# Patient Record
Sex: FEMALE | Race: BLACK OR AFRICAN AMERICAN | NOT HISPANIC OR LATINO | Employment: OTHER | URBAN - NONMETROPOLITAN AREA
[De-identification: names, ages, dates, MRNs, and addresses within clinical notes are randomized per-mention and may not be internally consistent; named-entity substitution may affect disease eponyms.]

---

## 2021-08-20 ENCOUNTER — OFFICE VISIT (OUTPATIENT)
Dept: FAMILY MEDICINE | Facility: CLINIC | Age: 76
End: 2021-08-20
Payer: MEDICARE

## 2021-08-20 VITALS
TEMPERATURE: 99 F | WEIGHT: 142 LBS | RESPIRATION RATE: 18 BRPM | DIASTOLIC BLOOD PRESSURE: 80 MMHG | HEIGHT: 62 IN | SYSTOLIC BLOOD PRESSURE: 119 MMHG | BODY MASS INDEX: 26.13 KG/M2 | HEART RATE: 83 BPM | OXYGEN SATURATION: 97 %

## 2021-08-20 DIAGNOSIS — B35.1 TOENAIL FUNGUS: ICD-10-CM

## 2021-08-20 DIAGNOSIS — E11.9 TYPE 2 DIABETES MELLITUS WITHOUT COMPLICATION, WITH LONG-TERM CURRENT USE OF INSULIN: ICD-10-CM

## 2021-08-20 DIAGNOSIS — Z79.4 TYPE 2 DIABETES MELLITUS WITHOUT COMPLICATION, WITH LONG-TERM CURRENT USE OF INSULIN: ICD-10-CM

## 2021-08-20 DIAGNOSIS — I10 HYPERTENSION, UNSPECIFIED TYPE: Primary | ICD-10-CM

## 2021-08-20 LAB
ALBUMIN SERPL BCP-MCNC: 3.7 G/DL (ref 3.5–5)
ALBUMIN/GLOB SERPL: 0.9 {RATIO}
ALP SERPL-CCNC: 129 U/L (ref 55–142)
ALT SERPL W P-5'-P-CCNC: 34 U/L (ref 13–56)
ANION GAP SERPL CALCULATED.3IONS-SCNC: 10 MMOL/L (ref 7–16)
AST SERPL W P-5'-P-CCNC: 23 U/L (ref 15–37)
BASOPHILS # BLD AUTO: 0.03 K/UL (ref 0–0.2)
BASOPHILS NFR BLD AUTO: 0.6 % (ref 0–1)
BILIRUB SERPL-MCNC: 0.2 MG/DL (ref 0–1.2)
BUN SERPL-MCNC: 14 MG/DL (ref 7–18)
BUN/CREAT SERPL: 20 (ref 6–20)
CALCIUM SERPL-MCNC: 10.2 MG/DL (ref 8.5–10.1)
CHLORIDE SERPL-SCNC: 109 MMOL/L (ref 98–107)
CHOLEST SERPL-MCNC: 119 MG/DL (ref 0–200)
CHOLEST/HDLC SERPL: 2.6 {RATIO}
CO2 SERPL-SCNC: 27 MMOL/L (ref 21–32)
CREAT SERPL-MCNC: 0.71 MG/DL (ref 0.55–1.02)
CREAT UR-MCNC: 119 MG/DL (ref 28–219)
DIFFERENTIAL METHOD BLD: ABNORMAL
EOSINOPHIL # BLD AUTO: 0.2 K/UL (ref 0–0.5)
EOSINOPHIL NFR BLD AUTO: 3.9 % (ref 1–4)
ERYTHROCYTE [DISTWIDTH] IN BLOOD BY AUTOMATED COUNT: 14.6 % (ref 11.5–14.5)
EST. AVERAGE GLUCOSE BLD GHB EST-MCNC: 120 MG/DL
GLOBULIN SER-MCNC: 4.2 G/DL (ref 2–4)
GLUCOSE SERPL-MCNC: 77 MG/DL (ref 74–106)
HBA1C MFR BLD HPLC: 6.2 % (ref 4.5–6.6)
HCT VFR BLD AUTO: 36.9 % (ref 38–47)
HDLC SERPL-MCNC: 45 MG/DL (ref 40–60)
HGB BLD-MCNC: 12.1 G/DL (ref 12–16)
IMM GRANULOCYTES # BLD AUTO: 0 K/UL (ref 0–0.04)
IMM GRANULOCYTES NFR BLD: 0 % (ref 0–0.4)
LDLC SERPL CALC-MCNC: 50 MG/DL
LDLC/HDLC SERPL: 1.1 {RATIO}
LYMPHOCYTES # BLD AUTO: 2.69 K/UL (ref 1–4.8)
LYMPHOCYTES NFR BLD AUTO: 52.1 % (ref 27–41)
MCH RBC QN AUTO: 24.7 PG (ref 27–31)
MCHC RBC AUTO-ENTMCNC: 32.8 G/DL (ref 32–36)
MCV RBC AUTO: 75.5 FL (ref 80–96)
MICROALBUMIN UR-MCNC: 0.7 MG/DL (ref 0–2.8)
MICROALBUMIN/CREAT RATIO PNL UR: 5.9 MG/G (ref 0–30)
MONOCYTES # BLD AUTO: 0.46 K/UL (ref 0–0.8)
MONOCYTES NFR BLD AUTO: 8.9 % (ref 2–6)
MPC BLD CALC-MCNC: 11.6 FL (ref 9.4–12.4)
NEUTROPHILS # BLD AUTO: 1.78 K/UL (ref 1.8–7.7)
NEUTROPHILS NFR BLD AUTO: 34.5 % (ref 53–65)
NONHDLC SERPL-MCNC: 74 MG/DL
NRBC # BLD AUTO: 0 X10E3/UL
NRBC, AUTO (.00): 0 %
PLATELET # BLD AUTO: 252 K/UL (ref 150–400)
POTASSIUM SERPL-SCNC: 4 MMOL/L (ref 3.5–5.1)
PROT SERPL-MCNC: 7.9 G/DL (ref 6.4–8.2)
RBC # BLD AUTO: 4.89 M/UL (ref 4.2–5.4)
SODIUM SERPL-SCNC: 142 MMOL/L (ref 136–145)
TRIGL SERPL-MCNC: 119 MG/DL (ref 35–150)
VLDLC SERPL-MCNC: 24 MG/DL
WBC # BLD AUTO: 5.16 K/UL (ref 4.5–11)

## 2021-08-20 PROCEDURE — 83036 HEMOGLOBIN A1C: ICD-10-PCS | Mod: ,,, | Performed by: CLINICAL MEDICAL LABORATORY

## 2021-08-20 PROCEDURE — 80061 LIPID PANEL: CPT | Mod: ,,, | Performed by: CLINICAL MEDICAL LABORATORY

## 2021-08-20 PROCEDURE — 99214 OFFICE O/P EST MOD 30 MIN: CPT | Mod: ,,, | Performed by: FAMILY MEDICINE

## 2021-08-20 PROCEDURE — 99214 PR OFFICE/OUTPT VISIT, EST, LEVL IV, 30-39 MIN: ICD-10-PCS | Mod: ,,, | Performed by: FAMILY MEDICINE

## 2021-08-20 PROCEDURE — 82043 UR ALBUMIN QUANTITATIVE: CPT | Mod: ,,, | Performed by: CLINICAL MEDICAL LABORATORY

## 2021-08-20 PROCEDURE — 82570 MICROALBUMIN / CREATININE RATIO URINE: ICD-10-PCS | Mod: ,,, | Performed by: CLINICAL MEDICAL LABORATORY

## 2021-08-20 PROCEDURE — 82570 ASSAY OF URINE CREATININE: CPT | Mod: ,,, | Performed by: CLINICAL MEDICAL LABORATORY

## 2021-08-20 PROCEDURE — 80061 LIPID PANEL: ICD-10-PCS | Mod: ,,, | Performed by: CLINICAL MEDICAL LABORATORY

## 2021-08-20 PROCEDURE — 80053 COMPREHEN METABOLIC PANEL: CPT | Mod: ,,, | Performed by: CLINICAL MEDICAL LABORATORY

## 2021-08-20 PROCEDURE — 82043 MICROALBUMIN / CREATININE RATIO URINE: ICD-10-PCS | Mod: ,,, | Performed by: CLINICAL MEDICAL LABORATORY

## 2021-08-20 PROCEDURE — 80053 COMPREHENSIVE METABOLIC PANEL: ICD-10-PCS | Mod: ,,, | Performed by: CLINICAL MEDICAL LABORATORY

## 2021-08-20 PROCEDURE — 83036 HEMOGLOBIN GLYCOSYLATED A1C: CPT | Mod: ,,, | Performed by: CLINICAL MEDICAL LABORATORY

## 2021-08-20 PROCEDURE — 85025 COMPLETE CBC W/AUTO DIFF WBC: CPT | Mod: ,,, | Performed by: CLINICAL MEDICAL LABORATORY

## 2021-08-20 PROCEDURE — 85025 CBC WITH DIFFERENTIAL: ICD-10-PCS | Mod: ,,, | Performed by: CLINICAL MEDICAL LABORATORY

## 2021-08-20 RX ORDER — TERBINAFINE HYDROCHLORIDE 250 MG/1
250 TABLET ORAL DAILY
Qty: 30 TABLET | Refills: 2 | Status: SHIPPED | OUTPATIENT
Start: 2021-08-20 | End: 2021-09-19

## 2021-08-20 RX ORDER — AMLODIPINE BESYLATE 5 MG/1
1 TABLET ORAL DAILY
COMMUNITY
Start: 2021-08-16 | End: 2021-08-20 | Stop reason: SDUPTHER

## 2021-08-20 RX ORDER — METFORMIN HYDROCHLORIDE 500 MG/1
500 TABLET ORAL 2 TIMES DAILY
Qty: 180 TABLET | Refills: 1 | Status: SHIPPED | OUTPATIENT
Start: 2021-08-20 | End: 2022-05-11 | Stop reason: SDUPTHER

## 2021-08-20 RX ORDER — AMLODIPINE BESYLATE 5 MG/1
5 TABLET ORAL DAILY
Qty: 90 TABLET | Refills: 1 | Status: SHIPPED | OUTPATIENT
Start: 2021-08-20 | End: 2022-07-08 | Stop reason: SDUPTHER

## 2021-08-20 RX ORDER — METFORMIN HYDROCHLORIDE 500 MG/1
500 TABLET ORAL 2 TIMES DAILY
COMMUNITY
Start: 2021-07-01 | End: 2021-08-20 | Stop reason: SDUPTHER

## 2021-08-27 ENCOUNTER — TELEPHONE (OUTPATIENT)
Dept: FAMILY MEDICINE | Facility: CLINIC | Age: 76
End: 2021-08-27

## 2021-08-30 DIAGNOSIS — Z79.4 TYPE 2 DIABETES MELLITUS WITHOUT COMPLICATION, WITH LONG-TERM CURRENT USE OF INSULIN: Primary | ICD-10-CM

## 2021-08-30 DIAGNOSIS — E11.9 TYPE 2 DIABETES MELLITUS WITHOUT COMPLICATION, WITH LONG-TERM CURRENT USE OF INSULIN: Primary | ICD-10-CM

## 2021-12-23 DIAGNOSIS — Z12.11 SCREENING FOR MALIGNANT NEOPLASM OF COLON: Primary | ICD-10-CM

## 2021-12-28 DIAGNOSIS — Z12.31 ENCOUNTER FOR SCREENING MAMMOGRAM FOR MALIGNANT NEOPLASM OF BREAST: Primary | ICD-10-CM

## 2022-02-11 ENCOUNTER — OFFICE VISIT (OUTPATIENT)
Dept: FAMILY MEDICINE | Facility: CLINIC | Age: 77
End: 2022-02-11
Payer: MEDICARE

## 2022-02-11 VITALS
SYSTOLIC BLOOD PRESSURE: 124 MMHG | OXYGEN SATURATION: 98 % | WEIGHT: 147 LBS | TEMPERATURE: 98 F | HEIGHT: 62 IN | HEART RATE: 80 BPM | RESPIRATION RATE: 18 BRPM | BODY MASS INDEX: 27.05 KG/M2 | DIASTOLIC BLOOD PRESSURE: 80 MMHG

## 2022-02-11 DIAGNOSIS — E11.9 TYPE 2 DIABETES MELLITUS WITHOUT COMPLICATION, WITHOUT LONG-TERM CURRENT USE OF INSULIN: Primary | ICD-10-CM

## 2022-02-11 DIAGNOSIS — Z78.0 ASYMPTOMATIC MENOPAUSE: ICD-10-CM

## 2022-02-11 DIAGNOSIS — I10 PRIMARY HYPERTENSION: ICD-10-CM

## 2022-02-11 PROCEDURE — 1101F PR PT FALLS ASSESS DOC 0-1 FALLS W/OUT INJ PAST YR: ICD-10-PCS | Mod: ,,, | Performed by: FAMILY MEDICINE

## 2022-02-11 PROCEDURE — 3288F PR FALLS RISK ASSESSMENT DOCUMENTED: ICD-10-PCS | Mod: ,,, | Performed by: FAMILY MEDICINE

## 2022-02-11 PROCEDURE — G0439 PPPS, SUBSEQ VISIT: HCPCS | Mod: ,,, | Performed by: FAMILY MEDICINE

## 2022-02-11 PROCEDURE — 1159F PR MEDICATION LIST DOCUMENTED IN MEDICAL RECORD: ICD-10-PCS | Mod: ,,, | Performed by: FAMILY MEDICINE

## 2022-02-11 PROCEDURE — 1126F AMNT PAIN NOTED NONE PRSNT: CPT | Mod: ,,, | Performed by: FAMILY MEDICINE

## 2022-02-11 PROCEDURE — 1126F PR PAIN SEVERITY QUANTIFIED, NO PAIN PRESENT: ICD-10-PCS | Mod: ,,, | Performed by: FAMILY MEDICINE

## 2022-02-11 PROCEDURE — 1159F MED LIST DOCD IN RCRD: CPT | Mod: ,,, | Performed by: FAMILY MEDICINE

## 2022-02-11 PROCEDURE — 1160F PR REVIEW ALL MEDS BY PRESCRIBER/CLIN PHARMACIST DOCUMENTED: ICD-10-PCS | Mod: ,,, | Performed by: FAMILY MEDICINE

## 2022-02-11 PROCEDURE — 3288F FALL RISK ASSESSMENT DOCD: CPT | Mod: ,,, | Performed by: FAMILY MEDICINE

## 2022-02-11 PROCEDURE — G0439 PR MEDICARE ANNUAL WELLNESS SUBSEQUENT VISIT: ICD-10-PCS | Mod: ,,, | Performed by: FAMILY MEDICINE

## 2022-02-11 PROCEDURE — 3074F SYST BP LT 130 MM HG: CPT | Mod: ,,, | Performed by: FAMILY MEDICINE

## 2022-02-11 PROCEDURE — 3079F PR MOST RECENT DIASTOLIC BLOOD PRESSURE 80-89 MM HG: ICD-10-PCS | Mod: ,,, | Performed by: FAMILY MEDICINE

## 2022-02-11 PROCEDURE — 3079F DIAST BP 80-89 MM HG: CPT | Mod: ,,, | Performed by: FAMILY MEDICINE

## 2022-02-11 PROCEDURE — 3074F PR MOST RECENT SYSTOLIC BLOOD PRESSURE < 130 MM HG: ICD-10-PCS | Mod: ,,, | Performed by: FAMILY MEDICINE

## 2022-02-11 PROCEDURE — 1160F RVW MEDS BY RX/DR IN RCRD: CPT | Mod: ,,, | Performed by: FAMILY MEDICINE

## 2022-02-11 PROCEDURE — 1101F PT FALLS ASSESS-DOCD LE1/YR: CPT | Mod: ,,, | Performed by: FAMILY MEDICINE

## 2022-02-11 NOTE — PROGRESS NOTES
[address] [place] [address]  (P) [phone]  (Fax) [phone]       PATIENT NAME: Luisana Barrett   : 1945    AGE: 76 y.o. DATE: 2022   MRN: [id number]        Reason for Visit / Chief Complaint: Medicare AWV ([facility] MEDICARE AWV SUB )       Luisana Barrett presents for an subsequent [facility] Medicare AWV today.    The following components were reviewed and updated:    Medical/Social/Family History:  Past Medical History:   Diagnosis Date    Allergy     Diabetes mellitus, type 2     Hypertension         Family History   Problem Relation Age of Onset    Stroke Mother     Cancer Father     Heart disease Sister     Stroke Sister         Social History     Tobacco Use   Smoking Status Never Smoker   Smokeless Tobacco Never Used      Social History     Substance and Sexual Activity   Alcohol Use Never       Family History   Problem Relation Age of Onset    Stroke Mother     Cancer Father     Heart disease Sister     Stroke Sister        Past Surgical History:   Procedure Laterality Date    HYSTERECTOMY      TUBAL LIGATION         PHQ9 2022   Total Score 0       · Allergies and Current Medications   Review of patient's allergies indicates:  No Known Allergies    Current Outpatient Medications:     amLODIPine (NORVASC) 5 MG tablet, Take 1 tablet (5 mg total) by mouth once daily., Disp: 90 tablet, Rfl: 1    metFORMIN (GLUCOPHAGE) 500 MG tablet, Take 1 tablet (500 mg total) by mouth 2 (two) times daily., Disp: 180 tablet, Rfl: 1    · Health Risk Assessment  What [age] your [age]?: 70-79  Are you male or female?: Female  During the past four weeks, how much have you been bothered by emotional problems such as feeling anxious, depressed, irritable, sad, or downhearted and blue?: Not at all  During the past five weeks, has your physical and/or emotional health limited your social activities with family, friends, neighbors, or groups?: Not at all  During  the past four weeks, how much bodily pain have you generally had?: Mild pain  During the past four weeks, was someone available to help if you needed and wanted help?: Yes, quite a bit  During the past four weeks, what was the hardest physical activity you could do for at least two minutes?: Very heavy  Can you get to places out of walking distance without help?  (For example, can you travel alone on buses or taxis, or drive your own car?): Yes  Can you go shopping for groceries or clothes without someone's help?: Yes  Can you prepare your own meals?: Yes  Can you do your own housework without help?: Yes  Because of any health problems, do you need the help of another person with your personal care needs such as eating, bathing, dressing, or getting around the house?: No  Can you handle your own money without help?: Yes  During the past four weeks, how would you rate your health in general?: Fair  How have things been going for you during the past four weeks?: Pretty well  Are you having difficulties driving your car?: No  Do you always fasten your seat belt when you are in a car?: Yes, usually  How often in the past four weeks have you been bothered by falling or dizzy when standing up?: Never  How often in the past four weeks have you been bothered by sexual problems?: Never  How often in the past four weeks have you been bothered by trouble eating well?: Never  How often in the past four weeks have you been bothered by teeth or denture problems?: Never  How often in the past four weeks have you been bothered with problems using the telephone?: Never  How often in the past four weeks have you been bothered by tiredness or fatigue?: Never  Have you fallen two or more times in the past year?: No  Are you afraid of falling?: Yes  Are you a smoker?: No  During the past four weeks, how many drinks of wine, beer, or other alcoholic beverages did you have?: No alcohol at all  Do you exercise for about 20 minutes three or  more days a week?: Yes, most of the time  Have you been given any information to help you with hazards in your house that might hurt you?: Yes  Have you been given any information to help you with keeping track of your medications?: Yes  How often do you have trouble taking medicines the way you've been told to take them?: I always take them as prescribed  How confident are you that you can control and manage most of your health problems?: Somewhat confident  What is your race? (Check all that apply.):     Health Maintenance Topics with due status: Not Due       Topic Last Completion Date    TETANUS VACCINE 12/21/2017    Foot Exam 08/20/2021    Lipid Panel 08/20/2021    Hemoglobin A1c 08/20/2021     Health Maintenance Due   Topic Date Due    Diabetes Urine Screening  Never done    Eye Exam  Never done    DEXA SCAN  Never done         Lab results available in Epic or see dates from [facility] above:   Lab Results   Component Value Date    CHOL 119 08/20/2021     Lab Results   Component Value Date    HDL 45 08/20/2021     Lab Results   Component Value Date    LDLCALC 50 08/20/2021     Lab Results   Component Value Date    TRIG 119 08/20/2021     Lab Results   Component Value Date    CHOLHDL 2.6 08/20/2021       Lab Results   Component Value Date    HGBA1C 6.2 08/20/2021       Sodium   Date Value Ref Range Status   08/20/2021 142 136 - 145 mmol/L Final     Potassium   Date Value Ref Range Status   08/20/2021 4.0 3.5 - 5.1 mmol/L Final     Chloride   Date Value Ref Range Status   08/20/2021 109 (H) 98 - 107 mmol/L Final     CO2   Date Value Ref Range Status   08/20/2021 27 21 - 32 mmol/L Final     Glucose   Date Value Ref Range Status   08/20/2021 77 74 - 106 mg/dL Final     BUN   Date Value Ref Range Status   08/20/2021 14 7 - 18 mg/dL Final     Creatinine   Date Value Ref Range Status   08/20/2021 0.71 0.55 - 1.02 mg/dL Final     Calcium   Date Value Ref Range Status   08/20/2021 10.2 (H) 8.5 - 10.1 mg/dL  "Final     Total Protein   Date Value Ref Range Status   08/20/2021 7.9 6.4 - 8.2 g/dL Final     Albumin   Date Value Ref Range Status   08/20/2021 3.7 3.5 - 5.0 g/dL Final     Bilirubin, Total   Date Value Ref Range Status   08/20/2021 0.2 >0.0 - 1.2 mg/dL Final     Alk Phos   Date Value Ref Range Status   08/20/2021 129 55 - 142 U/L Final     AST   Date Value Ref Range Status   08/20/2021 23 15 - 37 U/L Final     ALT   Date Value Ref Range Status   08/20/2021 34 13 - 56 U/L Final     Anion Gap   Date Value Ref Range Status   08/20/2021 10 7 - 16 mmol/L Final     eGFR    Date Value Ref Range Status   08/20/2021 103 >=60 mL/min/1.73m² Final             Patient Care Team:  Mikayla Rosas MD as PCP - General (Family Medicine)       **See Completed Assessments for Annual Wellness visit within the encounter summary    The following assessments were completed & reviewed:  · Depression Screening  · Cognitive function Screening  · Timed Get Up Test  · Whisper Test      Objective:    Vitals:    02/11/22 1047   BP: 124/80   Pulse: 80   Resp: 18   Temp: 97.5 °F (36.4 °C)   SpO2: 98%   Weight: 66.7 kg (147 lb)   Height: 5' 2" (1.575 m)   PainSc: 0-No pain      Body mass index is 26.89 kg/m².  Ideal body weight: 50.1 kg (110 lb 7.2 oz)     Physical Exam      Assessment:     1. Type 2 diabetes mellitus without complication, without long-term current use of insulin    2. Primary hypertension    3. Asymptomatic menopause  - DXA Bone Density Spine And Hip; Future  - DXA Bone Density Spine And Hip         Plan:    Advised to call office if does not hear from anyone with referral appt within 2-3 weeks to check on status of referral. Voiced understanding.    Discussed and provided with a screening schedule and personal prevention plan in accordance with USPSTF age appropriate recommendations and Medicare screening guidelines.   Education, counseling, and referrals were provided as needed.  After Visit Summary " printed and given to patient which includes written education and a list of any referrals if indicated.     F/u plan for yearly AWV.

## 2022-02-11 NOTE — PATIENT INSTRUCTIONS
"  Patient Education       Diabetes and Diet   The Basics   Written by the doctors and editors at [facility]   Why is diet important in diabetes? -- Diet is important because it is part of diabetes treatment. Many people need to change what they eat and how much they eat to help treat their diabetes. It is important for people to treat their diabetes so that they:  · Keep their blood sugar at or near a normal level  · Prevent long-term problems, such as heart or kidney problems, that can happen in people with diabetes  Changing your diet can also help treat obesity, high blood pressure, and high cholesterol. These conditions can affect people with diabetes and can lead to future problems, such as heart attacks or strokes.  Who will work with me to change my diet? -- Your doctor or nurse will work with you to make a food plan to change your diet. They might also recommend that you work with a "dietitian." A dietitian is an expert on food and eating.  Do I need to eat at the same times every day? -- When and how often you should eat depends, in part, on the diabetes medicines you take. For example:  · People who take about the same amount of insulin at the same time each day (called a "fixed regimen") should eat meals at the same times. This is also true for people who take pills that increase insulin levels, such as sulfonylureas. Eating meals at the same time every day helps prevent low blood sugar.  · People who adjust the dose and timing of their insulin each day (called a "flexible regimen") do not always have to eat meals at the same time. That's because they can time their insulin dose for before they plan to eat, and also adjust the dose for how much they plan to eat.  · People who take medicines that don't usually cause low blood sugar, such as metformin, don't have to eat meals at the same time every day.  What do I need to think about when planning what to eat? -- Our bodies break down the food we eat into " "small pieces called carbohydrates, proteins, and fats.  When planning what to eat, people with diabetes need to think about:  · Carbohydrates (or "carbs") - Carbohydrates, which are sugars that our bodies use for energy, can raise a person's blood sugar level. Your doctor, nurse, or dietitian will tell you how many carbohydrates you should eat at each meal or snack. Foods that have carbohydrates include:  ? Bread, pasta, and rice  ? Vegetables and fruits  ? Dairy foods  ? Foods and drinks with added sugar  It is best to get your carbohydrates from fruits, vegetables, whole grains, and low-fat milk. It is best to avoid drinks with added sugar, like soda, juices, and sports drinks.   · Protein - Your doctor, nurse, or dietitian will tell you how much protein you should eat each day. It is best to eat lean meats, fish, eggs, beans, peas, soy products, nuts, and seeds.  · Fats - The type of fat you eat is more important than the amount of fat. "Saturated" and "trans" fats can increase your risk for heart problems, like a heart attack.  ? Foods that have saturated fats include meat, butter, cheese, and ice cream.  ? Foods that have trans fats include processed food with "partially hydrogenated oils" on the ingredient list. This may include fried foods, store bought cookies, muffins, pies, and cakes.  "Monounsaturated" and "polyunsaturated" fats are better for you. Foods with these types of fat include fish, avocado, olive oil, and nuts.  · Calories - People need to eat a certain amount of calories each day to keep their weight the same. People who are overweight and want to lose weight need to eat fewer calories each day.  · Fiber - Eating foods with a lot of fiber can help control a person's blood sugar level. Foods that have a lot of fiber include apples, green beans, peas, beans, lentils, nuts, oatmeal, and whole grains.  · Salt - People who have high blood pressure should not eat foods that contain a lot of salt " (also called sodium). People with high blood pressure should also eat healthy foods, such as fruits, vegetables, and low-fat dairy foods.  · Alcohol - Having more than 1 drink (for women) or 2 drinks (for men) a day can raise blood sugar levels. Also, drinks that have fruit juice or soda in them can raise blood sugar levels.  What can I do if I need to lose weight? -- If you need to lose weight, you can:  · Exercise - Try to get at least 30 minutes of physical activity a day, most days of the week. Even gentle exercise, like walking, is good for your health. Some people with diabetes need to change their medicine dose before they exercise. They might also need to check their blood sugar levels before and after exercising.  · Eat fewer calories - Your doctor, nurse, or dietitian can tell you how many calories you should eat each day in order to lose weight.  If you are worried about your weight, size, or shape, talk with your doctor, nurse, or dietitian. They can help you make changes to improve your health.  Can I eat the same foods as my family? -- Yes. You do not need to eat special foods if you have diabetes. You and your family can eat the same foods. Changing your diet is mostly about eating healthy foods and not eating too much.  What are the other parts of diabetes treatment? -- Besides changing your diet, the other parts of diabetes treatment are:  · Exercise  · Medicines  Some people with diabetes need to learn how to match their diet and exercise with their medicine dose. For example, people who use insulin might need to choose the dose of insulin they give themselves. To choose their dose, they need to think about:  · What they plan to eat at the next meal  · How much exercise they plan to do  · What their blood sugar level is  If the diet and exercise do not match the medicine dose, a person's blood sugar level can get too low or too high. Blood sugar levels that are too low or too high can cause  problems.  All topics are updated as new evidence becomes available and our peer review process is complete.  This topic retrieved from [employer] on: Sep 21, 2021.  Topic 79300 Version 7.0  Release: 29.4.2 - C29.263  © 2021 UpToDate, Inc. and/or its affiliates. All rights reserved.  Consumer Information Use and Disclaimer   This information is not specific medical advice and does not replace information you receive from your health care provider. This is only a brief summary of general information. It does NOT include all information about conditions, illnesses, injuries, tests, procedures, treatments, therapies, discharge instructions or life-style choices that may apply to you. You must talk with your health care provider for complete information about your health and treatment options. This information should not be used to decide whether or not to accept your health care provider's advice, instructions or recommendations. Only your health care provider has the knowledge and training to provide advice that is right for you. The use of this information is governed by the [employer] End User License Agreement, available at https://www.[employer]/en/solutions/[employer]/about/renetta.The use of [employer] content is governed by the [employer] Terms of Use. ©2021 UpToDate, Inc. All rights reserved.  Copyright   © 2021 UpToDate, Inc. and/or its affiliates. All rights reserved.  Patient Education       Preventing Falls   The Basics   Written by the doctors and editors at [facility]   Am I at risk of falling? -- Your risk of falling increases as you grow older. That's because getting older can make it harder to walk steadily and keep your balance. Also, the effects of falls are more serious in older people.  Overall, 3 to 4 out of every 10 people over the age of 65 fall each year. Up to 75 percent of people who fracture a hip never recover to the point they were before they had their fracture. If you have fallen in the past,  you are at higher risk of falling again.  Several things can increase your risk of a fall, including:  · Illness  · A change in the medicines you take  · An unsafe or unfamiliar setting (for example, a room with rugs or furniture that might trip you, or an area you don't know well)  How can my doctor help me to avoid falling? -- Your doctor can talk to you about the following things:  · Past falls - It is important to tell your doctor about any times you have fallen or almost fallen. He or she can then suggest ways to prevent another fall.  · Your health conditions - Some health problems can put you at risk of falling. These include conditions that affect eyesight, hearing, muscle strength, or balance.  · The medicines you take - Certain medicines can increase the risk of falling. These include some medicines that are used for sleeping problems, anxiety, high blood pressure, or depression. Adding new medicines, or changing doses of some medicines, can also affect your risk of falling.  The more your doctor knows about your situation, the better he or she will be able to help you. For example, if you fell because you have a condition that causes pain, your doctor might suggest treatments to deal with the pain. Or if one of your medicines is making you dizzy and more likely to fall, your doctor might switch you to a different medicine.  Is there anything I can do on my own? -- Yes. To help keep from falling, you can:  · Make your home safer - To avoid falling at home, get rid of things that might make you trip or slip. This might include furniture, electrical cords, clutter, and loose rugs (figure 1). Keep your home well-lit so that you can easily see where you are going. Avoid storing things in high places so you don't have to reach or climb.  · Wear sturdy shoes that fit well - Wearing shoes with high heels or slippery soles, or shoes that are too loose, can lead to falls. Walking around in bare feet, or only socks,  can also increase your risk of falling.  · Take vitamin D pills - Taking vitamin D might lower the risk of falls in older people. This is because vitamin D helps make bones and muscles stronger. Your doctor can talk to you about whether you should take extra vitamin D, and how much.  · Stay active - Exercising on a regular basis can help lower your risk of falling. It might also help prevent you from getting hurt if you do fall. It is best to do a few different activities that help with both strength and balance. There are many kinds of exercise that can be safe for older people. These include walking, swimming, and Dung Chi (a Chinese martial art that involves slow, gentle movements).  · Use a cane, walker, and other safety devices - If your doctor recommends that you use a cane or walker, be sure that it's the right size and you know how to use it. There are other devices that might help you avoid falling, too. These include grab bars or a sturdy seat for the shower, non-slip bath mats, and hand rails or treads for the stairs (to prevent slipping).  If you worry that you could fall, there are also alarm buttons that let you call for help if you fall and can't get up.  What should I do if I fall? -- If you fall, see your doctor right away, even if you aren't hurt. Your doctor can try to figure out what caused you to fall, and how likely you are to fall again. He or she will do an exam and talk to you about your health problems, medicines, and activities. Then he or she can suggest things you can do to avoid falling again.  Many older people have a hard time recovering after a fall. Doing things to prevent falling can help you to protect your health and independence.  All topics are updated as new evidence becomes available and our peer review process is complete.  This topic retrieved from [employer] on: Sep 21, 2021.  Topic 02949 Version 18.0  Release: 29.4.2 - C29.263  © 2021 UpToDate, Inc. and/or its  affiliates. All rights reserved.  figure 1: How to avoid falling at home     This picture shows some of the things that can cause a fall in your home. Look around and remove any loose rugs, electrical cords, clutter, or furniture that could trip you.  Graphic 04604 Version 1.0    Consumer Information Use and Disclaimer   This information is not specific medical advice and does not replace information you receive from your health care provider. This is only a brief summary of general information. It does NOT include all information about conditions, illnesses, injuries, tests, procedures, treatments, therapies, discharge instructions or life-style choices that may apply to you. You must talk with your health care provider for complete information about your health and treatment options. This information should not be used to decide whether or not to accept your health care provider's advice, instructions or recommendations. Only your health care provider has the knowledge and training to provide advice that is right for you. The use of this information is governed by the [employer] End User License Agreement, available at https://www.[employer]/en/solutions/[employer]Rock/about/renetta.The use of [employer] content is governed by the [employer] Terms of Use. ©2021 UpToDate, Inc. All rights reserved.  Copyright   © 2021 UpToDate, Inc. and/or its affiliates. All rights reserved.  Patient Education       Advance Directives   The Basics   Written by the doctors and editors at [facility]   What are advance directives? -- Advance directives are legal documents that allow you to spell out ahead of time what of types medical care you would want if you ever became unable to speak for yourself. These documents can help ensure that you get the care you want even if you have an unexpected serious illness or accident. The documents can also make things easier for the people who will need to make decisions for you if you ever become unable  to make them for yourself.  Are there different kinds of advance directives? -- Yes. The most useful kinds of advance directives are:  · Health care proxy (also called the durable power of  for health care) - The health care proxy document allows you to choose someone to make medical decisions for you if you become unable to speak for yourself. The benefit of having this document is that it makes your choice of a decision-maker clear to your doctors and family members. When you choose a health care proxy, it is important to talk to the person you choose about the things that you do or don't want. That way your decision-maker knows what to do later on if they ever have to speak for you.  · Living will - A living will is the document that tells health care providers what type of care you want if you become unable to speak for yourself. For instance, a living will allows you to record in writing whether you would want a feeding tube put in if you had a serious illness or accident.  · Do not resuscitate/do not intubate order (also called a DNR/DNI) - If you decide you do not want your heart restarted if it stops and you do not want a breathing tube put in if you stop breathing, you can ask for a DNR/DNI. This is a form that must be signed by a doctor. It tells all your health care providers that you have decided you do not want these treatments.  Advance directives work best when they are part of a team effort that includes not only the person making the decisions, but also doctors, emergency health workers, and places like hospitals and nursing homes.  The Physician Orders for Life Sustaining Treatment (POLST) is a form for people who already have a serious illness or are very weak and likely to need medical help. The POLST form spells out exactly what care should be given, and not given, based on your choices and wishes. It is signed by your doctor, and you can keep a copy at home to be used in the event of an  "emergency. A copy is also kept on file at any hospital or other place where you might get medical care. Not every state has a POLST program. To find out if your state has one, you can go online to www.polst.org.  How do I choose a health care proxy? -- Choose someone who:  · You know and trust  · Can separate their own wishes from yours  · You know would carry out your wishes if that became necessary  · Could be easily reached if they were needed  · Could handle it if other family members or loved ones wanted you to get treated differently than you would want  Some people choose a second person as an alternate proxy, in case their first choice cannot be reached at the time decisions need to be made.  Who should have an advance directive? -- Advance directives are a good idea for anyone, but they are especially important if:  · You are older than 65.  · You have a serious life-threatening illness, such as advanced cancer, or end-stage heart or liver failure.  · You want to make sure you can choose the person you would like as your health care proxy (decision-maker).  What kinds of decisions will I need to make? -- Your advance directives can have as much or as little detail as you want. But many people who have advance directives record their wishes about the following treatments:  · Breathing tubes - If you stop breathing or are having a very hard time breathing, you can get attached to a machine that will help you breathe. For that to happen, you will have to be "intubated." That means that a tube will be put down your throat and into your lungs. Then the tube will be connected to a breathing machine. When the tube is in place, you will not be able to talk, at least at first. Plus, you will probably be sedated, meaning that you are on medicines that make you sleep.  Sometimes a breathing machine is needed only for a short time. For instance, some people need the breathing machine just while they recover from a lung " "infection. When deciding about a breathing tube, consider whether you would want it at all, want it only for a short time, or want it no matter what. Also, keep in mind that any time a breathing machine is used, it is hard to know for sure if and when it will be able to be disconnected.  · Cardiopulmonary resuscitation (CPR) - If your heart stops beating suddenly, doctors might be able to restart it by pumping on your chest, putting in a breathing tube and pushing air into your lungs, giving you an electric shock (called "defibrillation"), and/or giving you special medicines. Some people recover completely after having their heart restarted. Others have permanent brain damage from a lack of blood flow to the brain; this is most likely in people who have an advanced, serious illness.  · Feeding tubes - If you become unable to eat, you can have a tube put into your stomach or intestines that can deliver nutrients. A feeding tube can keep a person's body going while they heal and gets strong. But it can also keep a person alive for a long time even if there is no chance the person will recover.  Can I change my mind? -- Yes. You can change your mind at any time. If you sign an advance directive and you decide you want a different kind of treatment or you no longer want the health care proxy you chose, all you have to do is tell your doctor or nurse about your new decision. If you want to name a new health care proxy or want to record new wishes, you can draw up new documents.  How can I draw up an advance directive? -- The following table lists resources that can help you learn more about making your own advance directives (table 1).  All topics are updated as new evidence becomes available and our peer review process is complete.  This topic retrieved from [employer] on: Sep 21, 2021.  Topic 54959 Version 13.0  Release: 29.4.2 - C29.263  © 2021 UpToDate, Inc. and/or its affiliates. All rights reserved.  table 1: " Resources that can help you make advance directives     Address  Phone number  Website    [facility]  [address] [place] [address] Toll-free: (009) JEA-AAR  [phone]] http://assets.aarp.org/external_sites/ caregiving/multimedia/EG_AdvanceDirectives.html    Aging with Dignity (Five Wishes form)  [address]  [place] [address] Toll-free: (533) 5WISHES  [phone]] www.agingwithdignity.org    [employer]    Toll-free: [phone] [phone] www.caringinfo.org    [employer]  [address] [place] [address] [phone] www.[employer]rom[employer]    Graphic 73763 Version 7.0  Consumer Information Use and Disclaimer   This information is not specific medical advice and does not replace information you receive from your health care provider. This is only a brief summary of general information. It does NOT include all information about conditions, illnesses, injuries, tests, procedures, treatments, therapies, discharge instructions or life-style choices that may apply to you. You must talk with your health care provider for complete information about your health and treatment options. This information should not be used to decide whether or not to accept your health care provider's advice, instructions or recommendations. Only your health care provider has the knowledge and training to provide advice that is right for you. The use of this information is governed by the [employer] End User License Agreement, available at https://www.WatchGuard.[employer]/en/solutions/i-Nalysis/about/renetta.The use of [employer] content is governed by the [employer] Terms of Use. ©2021 UpToDate, Inc. All rights reserved.  Copyright   © 2021 UpToDate, Inc. and/or its affiliates. All rights reserved.  Counseling and Referral of Other Preventative  (Italic type indicates deductible and co-insurance are waived)    Patient Name: Luisana Barrett  Today's Date: 2/11/2022    Health Maintenance        Date Due Completion Date    DEXA SCAN Never done ---    Influenza Vaccine (1) 06/30/2022 (Originally 9/1/2021) ---    Shingles Vaccine (1 of 2) 02/11/2023 (Originally 8/2/1995) ---    COVID-19 Vaccine (3 - Booster for Moderna series) 02/11/2023 (Originally 8/12/2021) 2/12/2021    Pneumococcal Vaccines (Age 65+) (2 of 2 - PPSV23) 02/11/2023 (Originally 12/26/2019) 12/26/2018    Lipid Panel 08/20/2022 8/20/2021    TETANUS VACCINE 12/21/2027 12/21/2017        No orders of the defined types were placed in this encounter.

## 2022-02-16 LAB — BMD RECOMMENDATION EXT: NORMAL

## 2022-05-11 DIAGNOSIS — Z79.4 TYPE 2 DIABETES MELLITUS WITHOUT COMPLICATION, WITH LONG-TERM CURRENT USE OF INSULIN: ICD-10-CM

## 2022-05-11 DIAGNOSIS — E11.9 TYPE 2 DIABETES MELLITUS WITHOUT COMPLICATION, WITH LONG-TERM CURRENT USE OF INSULIN: ICD-10-CM

## 2022-05-11 RX ORDER — ESTRADIOL 0.1 MG/G
1 CREAM VAGINAL
COMMUNITY
Start: 2022-02-22 | End: 2022-12-12 | Stop reason: SDUPTHER

## 2022-05-11 RX ORDER — METFORMIN HYDROCHLORIDE 500 MG/1
500 TABLET ORAL 2 TIMES DAILY
Qty: 180 TABLET | Refills: 1 | Status: SHIPPED | OUTPATIENT
Start: 2022-05-11 | End: 2022-07-08 | Stop reason: SDUPTHER

## 2022-05-11 NOTE — TELEPHONE ENCOUNTER
----- Message from Elyse Garcia sent at 5/10/2022 11:32 AM CDT -----  Patient has an appt set for Monday 5/16 but needs a refill of her metFORMIN (GLUCOPHAGE) 500 MG tablet [facility]. Good call back number is [phone]. Thanks!

## 2022-07-08 ENCOUNTER — OFFICE VISIT (OUTPATIENT)
Dept: FAMILY MEDICINE | Facility: CLINIC | Age: 77
End: 2022-07-08
Payer: MEDICARE

## 2022-07-08 VITALS
TEMPERATURE: 98 F | HEIGHT: 62 IN | HEART RATE: 64 BPM | OXYGEN SATURATION: 98 % | BODY MASS INDEX: 25.21 KG/M2 | WEIGHT: 137 LBS | DIASTOLIC BLOOD PRESSURE: 80 MMHG | RESPIRATION RATE: 15 BRPM | SYSTOLIC BLOOD PRESSURE: 130 MMHG

## 2022-07-08 DIAGNOSIS — Z79.4 TYPE 2 DIABETES MELLITUS WITHOUT COMPLICATION, WITH LONG-TERM CURRENT USE OF INSULIN: ICD-10-CM

## 2022-07-08 DIAGNOSIS — I10 ESSENTIAL HYPERTENSION, BENIGN: Primary | ICD-10-CM

## 2022-07-08 DIAGNOSIS — E11.9 TYPE 2 DIABETES MELLITUS WITHOUT COMPLICATION, WITH LONG-TERM CURRENT USE OF INSULIN: ICD-10-CM

## 2022-07-08 LAB
ALBUMIN SERPL BCP-MCNC: 4 G/DL (ref 3.5–5)
ALBUMIN/GLOB SERPL: 1.2 {RATIO}
ALP SERPL-CCNC: 85 U/L (ref 55–142)
ALT SERPL W P-5'-P-CCNC: 19 U/L (ref 13–56)
ANION GAP SERPL CALCULATED.3IONS-SCNC: 12 MMOL/L (ref 7–16)
AST SERPL W P-5'-P-CCNC: 14 U/L (ref 15–37)
BASOPHILS # BLD AUTO: 0.04 K/UL (ref 0–0.2)
BASOPHILS NFR BLD AUTO: 0.8 % (ref 0–1)
BILIRUB SERPL-MCNC: 0.6 MG/DL (ref 0–1.2)
BUN SERPL-MCNC: 12 MG/DL (ref 7–18)
BUN/CREAT SERPL: 12 (ref 6–20)
CALCIUM SERPL-MCNC: 9.8 MG/DL (ref 8.5–10.1)
CHLORIDE SERPL-SCNC: 104 MMOL/L (ref 98–107)
CHOLEST SERPL-MCNC: 139 MG/DL (ref 0–200)
CHOLEST/HDLC SERPL: 2.7 {RATIO}
CO2 SERPL-SCNC: 28 MMOL/L (ref 21–32)
CREAT SERPL-MCNC: 0.98 MG/DL (ref 0.55–1.02)
DIFFERENTIAL METHOD BLD: ABNORMAL
EOSINOPHIL # BLD AUTO: 0.12 K/UL (ref 0–0.5)
EOSINOPHIL NFR BLD AUTO: 2.4 % (ref 1–4)
ERYTHROCYTE [DISTWIDTH] IN BLOOD BY AUTOMATED COUNT: 13.8 % (ref 11.5–14.5)
EST. AVERAGE GLUCOSE BLD GHB EST-MCNC: 114 MG/DL
GLOBULIN SER-MCNC: 3.4 G/DL (ref 2–4)
GLUCOSE SERPL-MCNC: 103 MG/DL (ref 74–106)
HBA1C MFR BLD HPLC: 6 % (ref 4.5–6.6)
HCT VFR BLD AUTO: 38.4 % (ref 38–47)
HDLC SERPL-MCNC: 52 MG/DL (ref 40–60)
HGB BLD-MCNC: 12.7 G/DL (ref 12–16)
IMM GRANULOCYTES # BLD AUTO: 0.01 K/UL (ref 0–0.04)
IMM GRANULOCYTES NFR BLD: 0.2 % (ref 0–0.4)
LDLC SERPL CALC-MCNC: 62 MG/DL
LDLC/HDLC SERPL: 1.2 {RATIO}
LYMPHOCYTES # BLD AUTO: 2.22 K/UL (ref 1–4.8)
LYMPHOCYTES NFR BLD AUTO: 44.6 % (ref 27–41)
MCH RBC QN AUTO: 27.3 PG (ref 27–31)
MCHC RBC AUTO-ENTMCNC: 33.1 G/DL (ref 32–36)
MCV RBC AUTO: 82.6 FL (ref 80–96)
MONOCYTES # BLD AUTO: 0.34 K/UL (ref 0–0.8)
MONOCYTES NFR BLD AUTO: 6.8 % (ref 2–6)
MPC BLD CALC-MCNC: 11.9 FL (ref 9.4–12.4)
NEUTROPHILS # BLD AUTO: 2.25 K/UL (ref 1.8–7.7)
NEUTROPHILS NFR BLD AUTO: 45.2 % (ref 53–65)
NONHDLC SERPL-MCNC: 87 MG/DL
NRBC # BLD AUTO: 0 X10E3/UL
NRBC, AUTO (.00): 0 %
PLATELET # BLD AUTO: 263 K/UL (ref 150–400)
POTASSIUM SERPL-SCNC: 4 MMOL/L (ref 3.5–5.1)
PROT SERPL-MCNC: 7.4 G/DL (ref 6.4–8.2)
RBC # BLD AUTO: 4.65 M/UL (ref 4.2–5.4)
SODIUM SERPL-SCNC: 140 MMOL/L (ref 136–145)
TRIGL SERPL-MCNC: 123 MG/DL (ref 35–150)
VLDLC SERPL-MCNC: 25 MG/DL
WBC # BLD AUTO: 4.98 K/UL (ref 4.5–11)

## 2022-07-08 PROCEDURE — 85025 CBC WITH DIFFERENTIAL: ICD-10-PCS | Mod: ,,, | Performed by: CLINICAL MEDICAL LABORATORY

## 2022-07-08 PROCEDURE — 3075F PR MOST RECENT SYSTOLIC BLOOD PRESS GE 130-139MM HG: ICD-10-PCS | Mod: ,,, | Performed by: NURSE PRACTITIONER

## 2022-07-08 PROCEDURE — 83036 HEMOGLOBIN GLYCOSYLATED A1C: CPT | Mod: ,,, | Performed by: CLINICAL MEDICAL LABORATORY

## 2022-07-08 PROCEDURE — 85025 COMPLETE CBC W/AUTO DIFF WBC: CPT | Mod: ,,, | Performed by: CLINICAL MEDICAL LABORATORY

## 2022-07-08 PROCEDURE — 99214 OFFICE O/P EST MOD 30 MIN: CPT | Mod: ,,, | Performed by: NURSE PRACTITIONER

## 2022-07-08 PROCEDURE — 1159F PR MEDICATION LIST DOCUMENTED IN MEDICAL RECORD: ICD-10-PCS | Mod: ,,, | Performed by: NURSE PRACTITIONER

## 2022-07-08 PROCEDURE — 83036 HEMOGLOBIN A1C: ICD-10-PCS | Mod: ,,, | Performed by: CLINICAL MEDICAL LABORATORY

## 2022-07-08 PROCEDURE — 3079F PR MOST RECENT DIASTOLIC BLOOD PRESSURE 80-89 MM HG: ICD-10-PCS | Mod: ,,, | Performed by: NURSE PRACTITIONER

## 2022-07-08 PROCEDURE — 80061 LIPID PANEL: CPT | Mod: ,,, | Performed by: CLINICAL MEDICAL LABORATORY

## 2022-07-08 PROCEDURE — 80053 COMPREHEN METABOLIC PANEL: CPT | Mod: ,,, | Performed by: CLINICAL MEDICAL LABORATORY

## 2022-07-08 PROCEDURE — 3075F SYST BP GE 130 - 139MM HG: CPT | Mod: ,,, | Performed by: NURSE PRACTITIONER

## 2022-07-08 PROCEDURE — 1126F PR PAIN SEVERITY QUANTIFIED, NO PAIN PRESENT: ICD-10-PCS | Mod: ,,, | Performed by: NURSE PRACTITIONER

## 2022-07-08 PROCEDURE — 99214 PR OFFICE/OUTPT VISIT, EST, LEVL IV, 30-39 MIN: ICD-10-PCS | Mod: ,,, | Performed by: NURSE PRACTITIONER

## 2022-07-08 PROCEDURE — 80061 LIPID PANEL: ICD-10-PCS | Mod: ,,, | Performed by: CLINICAL MEDICAL LABORATORY

## 2022-07-08 PROCEDURE — 1126F AMNT PAIN NOTED NONE PRSNT: CPT | Mod: ,,, | Performed by: NURSE PRACTITIONER

## 2022-07-08 PROCEDURE — 80053 COMPREHENSIVE METABOLIC PANEL: ICD-10-PCS | Mod: ,,, | Performed by: CLINICAL MEDICAL LABORATORY

## 2022-07-08 PROCEDURE — 1159F MED LIST DOCD IN RCRD: CPT | Mod: ,,, | Performed by: NURSE PRACTITIONER

## 2022-07-08 PROCEDURE — 3079F DIAST BP 80-89 MM HG: CPT | Mod: ,,, | Performed by: NURSE PRACTITIONER

## 2022-07-08 RX ORDER — METFORMIN HYDROCHLORIDE 500 MG/1
500 TABLET ORAL 2 TIMES DAILY
Qty: 180 TABLET | Refills: 1 | Status: SHIPPED | OUTPATIENT
Start: 2022-07-08 | End: 2022-12-12 | Stop reason: SDUPTHER

## 2022-07-08 RX ORDER — AMLODIPINE BESYLATE 5 MG/1
5 TABLET ORAL DAILY
Qty: 90 TABLET | Refills: 1 | Status: SHIPPED | OUTPATIENT
Start: 2022-07-08 | End: 2022-12-12 | Stop reason: SDUPTHER

## 2022-07-08 NOTE — PROGRESS NOTES
"[facility] - FAMILY MEDICINE  Phone: [phone]       PATIENT NAME: Luisana Barrett   : 1945    AGE: 76 y.o. DATE: 2022    MRN: [id number]        Reason for Visit / Chief Complaint:  Medication Refill (Patient is here today for medication refills. )     Subjective:     HPI:  76 yr old AAF presents to the office for 6m f/u for htn, t2dm  Denies any complaints     Review of Systems:    Pertinent items are above noted in HPI.  A comprehensive review of systems was negative   Review of patient's allergies indicates:  No Known Allergies     Med List:  Current Outpatient Medications on File Prior to Visit   Medication Sig Dispense Refill    [DISCONTINUED] amLODIPine (NORVASC) 5 MG tablet Take 1 tablet (5 mg total) by mouth once daily. 90 tablet 1    [DISCONTINUED] metFORMIN (GLUCOPHAGE) 500 MG tablet Take 1 tablet (500 mg total) by mouth 2 (two) times daily. 180 tablet 1    estradioL (ESTRACE) 0.01 % (0.1 mg/gram) vaginal cream Place 1 g vaginally twice a week.       No current facility-administered medications on file prior to visit.       Medical/Social/Family History:  Past Medical History:   Diagnosis Date    Allergy     Diabetes mellitus, type 2     Hypertension       Social History     Tobacco Use   Smoking Status Never Smoker   Smokeless Tobacco Never Used      Social History     Substance and Sexual Activity   Alcohol Use Never       Family History   Problem Relation Age of Onset    Stroke Mother     Cancer Father     Heart disease Sister     Stroke Sister       Past Surgical History:   Procedure Laterality Date    HYSTERECTOMY      TUBAL LIGATION            Objective:      Vitals:    22 1004   BP: 130/80   BP Location: Right arm   Patient Position: Sitting   Pulse: 64   Resp: 15   Temp: 98 °F (36.7 °C)   SpO2: 98%   Weight: 62.1 kg (137 lb)   Height: 5' 2" (1.575 m)     Body mass index is 25.06 kg/m².     Physical Exam:    General: well " developed, well nourished    Head: normocephalic, atraumatic    Respiratory: unlabored respirations, no intercostal retractions or accessory muscle use, clear to auscultation without rales or wheezes    Cardiovascular: normal rate and regular rhythm, S1 and S2 normal, no murmurs noted    Abdomen: soft, nontender    Musculoskeletal: negative; full range of motion, no tenderness, palpable spasm or pain on motion    Lymphadenopathy: No cervical or supraclavicular adenopathy    Neurological: normal without focal findings and mental status, speech normal, alert and oriented x3    Skin: Skin color, texture, turgor normal. No rashes or lesions    Psych: no auditory or visual hallucinations, no anxiety, no depression/worrying alot       Assessment:          ICD-10-CM ICD-9-CM   1. Essential hypertension, benign  I10 401.1   2. Type 2 diabetes mellitus without complication, with long-term current use of insulin  E11.9 250.00    Z79.4 V58.67        Plan:       Essential hypertension, benign  -     amLODIPine (NORVASC) 5 MG tablet; Take 1 tablet (5 mg total) by mouth once daily.  Dispense: 90 tablet; Refill: 1  -     CBC Auto Differential; Future; Expected date: 07/08/2022  -     Comprehensive Metabolic Panel; Future; Expected date: 07/08/2022  -     Lipid Panel; Future; Expected date: 07/08/2022    Type 2 diabetes mellitus without complication, with long-term current use of insulin  -     metFORMIN (GLUCOPHAGE) 500 MG tablet; Take 1 tablet (500 mg total) by mouth 2 (two) times daily.  Dispense: 180 tablet; Refill: 1  -     Hemoglobin A1C; Future; Expected date: 07/08/2022        Current Outpatient Medications:     amLODIPine (NORVASC) 5 MG tablet, Take 1 tablet (5 mg total) by mouth once daily., Disp: 90 tablet, Rfl: 1    estradioL (ESTRACE) 0.01 % (0.1 mg/gram) vaginal cream, Place 1 g vaginally twice a week., Disp: , Rfl:     metFORMIN (GLUCOPHAGE) 500 MG tablet, Take 1 tablet (500 mg total) by mouth 2 (two) times  daily., Disp: 180 tablet, Rfl: 1      New & refilled meds:  Requested Prescriptions     Signed Prescriptions Disp Refills    amLODIPine (NORVASC) 5 MG tablet 90 tablet 1     Sig: Take 1 tablet (5 mg total) by mouth once daily.    metFORMIN (GLUCOPHAGE) 500 MG tablet 180 tablet 1     Sig: Take 1 tablet (500 mg total) by mouth 2 (two) times daily.     Treatment Recommendations:    Orders and follow up as documented in patient record.    Return to clinic in 6 months for htn, t2dm and as needed.    Signature: CARLOTA Oneal

## 2022-10-10 ENCOUNTER — CLINICAL SUPPORT (OUTPATIENT)
Dept: FAMILY MEDICINE | Facility: CLINIC | Age: 77
End: 2022-10-10
Payer: MEDICARE

## 2022-10-10 DIAGNOSIS — Z23 ENCOUNTER FOR ADMINISTRATION OF VACCINE: Primary | ICD-10-CM

## 2022-10-10 PROCEDURE — 90677 PR PNEUMOCOCCAL CONJ VACCINE, 20 VALENT, IM: ICD-10-PCS | Mod: ,,, | Performed by: NURSE PRACTITIONER

## 2022-10-10 PROCEDURE — G0009 ADMIN PNEUMOCOCCAL VACCINE: HCPCS | Mod: ,,, | Performed by: NURSE PRACTITIONER

## 2022-10-10 PROCEDURE — G0008 ADMIN INFLUENZA VIRUS VAC: HCPCS | Mod: ,,, | Performed by: NURSE PRACTITIONER

## 2022-10-10 PROCEDURE — G0009 PR ADMIN PNEUMOCOCCAL VACCINE: ICD-10-PCS | Mod: ,,, | Performed by: NURSE PRACTITIONER

## 2022-10-10 PROCEDURE — 90694 FLU VACCINE - QUADRIVALENT - ADJUVANTED: ICD-10-PCS | Mod: ,,, | Performed by: NURSE PRACTITIONER

## 2022-10-10 PROCEDURE — 90694 VACC AIIV4 NO PRSRV 0.5ML IM: CPT | Mod: ,,, | Performed by: NURSE PRACTITIONER

## 2022-10-10 PROCEDURE — G0008 FLU VACCINE - QUADRIVALENT - ADJUVANTED: ICD-10-PCS | Mod: ,,, | Performed by: NURSE PRACTITIONER

## 2022-10-10 PROCEDURE — 90677 PCV20 VACCINE IM: CPT | Mod: ,,, | Performed by: NURSE PRACTITIONER

## 2022-10-10 NOTE — PROGRESS NOTES
Pt arrived at clinic requesting flu and pneumococcal vaccines.  Flu vaccine administered IM into left deltoid.  Pt tolerated well.  Prevnar 20 vaccine administered IM in right deltoid.  Pt tolerated well. Pt waited in clinic for 15 minutes post administration with no s/sx of reaction seen.

## 2022-11-09 DIAGNOSIS — Z71.89 COMPLEX CARE COORDINATION: ICD-10-CM

## 2022-12-12 ENCOUNTER — OFFICE VISIT (OUTPATIENT)
Dept: FAMILY MEDICINE | Facility: CLINIC | Age: 77
End: 2022-12-12
Payer: MEDICARE

## 2022-12-12 VITALS
DIASTOLIC BLOOD PRESSURE: 79 MMHG | WEIGHT: 148 LBS | RESPIRATION RATE: 18 BRPM | TEMPERATURE: 98 F | HEART RATE: 62 BPM | SYSTOLIC BLOOD PRESSURE: 116 MMHG | HEIGHT: 62 IN | OXYGEN SATURATION: 98 % | BODY MASS INDEX: 27.23 KG/M2

## 2022-12-12 DIAGNOSIS — N95.1 POST MENOPAUSAL SYNDROME: ICD-10-CM

## 2022-12-12 DIAGNOSIS — I10 ESSENTIAL HYPERTENSION, BENIGN: Primary | ICD-10-CM

## 2022-12-12 DIAGNOSIS — Z79.4 TYPE 2 DIABETES MELLITUS WITHOUT COMPLICATION, WITH LONG-TERM CURRENT USE OF INSULIN: ICD-10-CM

## 2022-12-12 DIAGNOSIS — E11.9 TYPE 2 DIABETES MELLITUS WITHOUT COMPLICATION, WITH LONG-TERM CURRENT USE OF INSULIN: ICD-10-CM

## 2022-12-12 LAB
CREAT UR-MCNC: 63 MG/DL (ref 28–219)
EST. AVERAGE GLUCOSE BLD GHB EST-MCNC: 127 MG/DL
HBA1C MFR BLD HPLC: 6.4 % (ref 4.5–6.6)
MICROALBUMIN UR-MCNC: <0.5 MG/DL (ref 0–2.8)
MICROALBUMIN/CREAT RATIO PNL UR: <7.9 MG/G (ref 0–30)

## 2022-12-12 PROCEDURE — 83036 HEMOGLOBIN GLYCOSYLATED A1C: CPT | Mod: ,,, | Performed by: CLINICAL MEDICAL LABORATORY

## 2022-12-12 PROCEDURE — 3078F DIAST BP <80 MM HG: CPT | Mod: ,,, | Performed by: FAMILY MEDICINE

## 2022-12-12 PROCEDURE — 82043 MICROALBUMIN / CREATININE RATIO URINE: ICD-10-PCS | Mod: ,,, | Performed by: CLINICAL MEDICAL LABORATORY

## 2022-12-12 PROCEDURE — 82570 MICROALBUMIN / CREATININE RATIO URINE: ICD-10-PCS | Mod: ,,, | Performed by: CLINICAL MEDICAL LABORATORY

## 2022-12-12 PROCEDURE — 1101F PR PT FALLS ASSESS DOC 0-1 FALLS W/OUT INJ PAST YR: ICD-10-PCS | Mod: ,,, | Performed by: FAMILY MEDICINE

## 2022-12-12 PROCEDURE — 83036 HEMOGLOBIN A1C: ICD-10-PCS | Mod: ,,, | Performed by: CLINICAL MEDICAL LABORATORY

## 2022-12-12 PROCEDURE — 1160F PR REVIEW ALL MEDS BY PRESCRIBER/CLIN PHARMACIST DOCUMENTED: ICD-10-PCS | Mod: ,,, | Performed by: FAMILY MEDICINE

## 2022-12-12 PROCEDURE — 1126F AMNT PAIN NOTED NONE PRSNT: CPT | Mod: ,,, | Performed by: FAMILY MEDICINE

## 2022-12-12 PROCEDURE — 3074F PR MOST RECENT SYSTOLIC BLOOD PRESSURE < 130 MM HG: ICD-10-PCS | Mod: ,,, | Performed by: FAMILY MEDICINE

## 2022-12-12 PROCEDURE — 3074F SYST BP LT 130 MM HG: CPT | Mod: ,,, | Performed by: FAMILY MEDICINE

## 2022-12-12 PROCEDURE — 82043 UR ALBUMIN QUANTITATIVE: CPT | Mod: ,,, | Performed by: CLINICAL MEDICAL LABORATORY

## 2022-12-12 PROCEDURE — 99214 PR OFFICE/OUTPT VISIT, EST, LEVL IV, 30-39 MIN: ICD-10-PCS | Mod: ,,, | Performed by: FAMILY MEDICINE

## 2022-12-12 PROCEDURE — 82570 ASSAY OF URINE CREATININE: CPT | Mod: ,,, | Performed by: CLINICAL MEDICAL LABORATORY

## 2022-12-12 PROCEDURE — 3288F PR FALLS RISK ASSESSMENT DOCUMENTED: ICD-10-PCS | Mod: ,,, | Performed by: FAMILY MEDICINE

## 2022-12-12 PROCEDURE — 1159F PR MEDICATION LIST DOCUMENTED IN MEDICAL RECORD: ICD-10-PCS | Mod: ,,, | Performed by: FAMILY MEDICINE

## 2022-12-12 PROCEDURE — 3078F PR MOST RECENT DIASTOLIC BLOOD PRESSURE < 80 MM HG: ICD-10-PCS | Mod: ,,, | Performed by: FAMILY MEDICINE

## 2022-12-12 PROCEDURE — 1101F PT FALLS ASSESS-DOCD LE1/YR: CPT | Mod: ,,, | Performed by: FAMILY MEDICINE

## 2022-12-12 PROCEDURE — 1159F MED LIST DOCD IN RCRD: CPT | Mod: ,,, | Performed by: FAMILY MEDICINE

## 2022-12-12 PROCEDURE — 99214 OFFICE O/P EST MOD 30 MIN: CPT | Mod: ,,, | Performed by: FAMILY MEDICINE

## 2022-12-12 PROCEDURE — 3288F FALL RISK ASSESSMENT DOCD: CPT | Mod: ,,, | Performed by: FAMILY MEDICINE

## 2022-12-12 PROCEDURE — 1160F RVW MEDS BY RX/DR IN RCRD: CPT | Mod: ,,, | Performed by: FAMILY MEDICINE

## 2022-12-12 PROCEDURE — 1126F PR PAIN SEVERITY QUANTIFIED, NO PAIN PRESENT: ICD-10-PCS | Mod: ,,, | Performed by: FAMILY MEDICINE

## 2022-12-12 RX ORDER — ESTRADIOL 0.1 MG/G
1 CREAM VAGINAL
Qty: 42.5 G | Refills: 1 | Status: SHIPPED | OUTPATIENT
Start: 2022-12-12 | End: 2023-12-11 | Stop reason: SDUPTHER

## 2022-12-12 RX ORDER — METFORMIN HYDROCHLORIDE 500 MG/1
500 TABLET ORAL 2 TIMES DAILY
Qty: 180 TABLET | Refills: 1 | Status: SHIPPED | OUTPATIENT
Start: 2022-12-12 | End: 2023-06-12 | Stop reason: SDUPTHER

## 2022-12-12 RX ORDER — AMLODIPINE BESYLATE 5 MG/1
5 TABLET ORAL DAILY
Qty: 90 TABLET | Refills: 1 | Status: SHIPPED | OUTPATIENT
Start: 2022-12-12 | End: 2023-06-12 | Stop reason: SDUPTHER

## 2022-12-12 RX ORDER — ASPIRIN 81 MG/1
81 TABLET ORAL DAILY
COMMUNITY

## 2022-12-12 NOTE — PROGRESS NOTES
"New Clinic Note    Luisana Barrett is a 77 y.o. female     CC:   Chief Complaint   Patient presents with    Annual Exam     Patient stated she is here for her 6 month general health evaluation, renewal of prescription sent to [facility] in [place], is "fasting" for labs.    Diabetes     Has a diabetic eye exam this Thursday and records will be requested.     Hypertension    Medication Refill        Subjective      Presents to clinic for follow up on chronic health problems. She needs refills. Medical conditions are stable and controlled.     Hypertension:    Takes medications as directed: yes  Checks blood pressure outside of clinic: yes  On a statin: no  Cardiovascular exercise: no  Heart healthy diet: no   Diabetes, type 2:    Checks glucose outside of clinic:yes  Keeps log of glucoses: no  Eats a diabetic diet: no  Regular cardiovascular exercise: no  Monitors feet: yes  Most recent HbA1c values:   Hemoglobin A1C   Date Value Ref Range Status   12/12/2022 6.4 4.5 - 6.6 % Final     Comment:       Normal:               <5.7%  Pre-Diabetic:       5.7% to 6.4%  Diabetic:             >6.4%  Diabetic Goal:     <7%   07/08/2022 6.0 4.5 - 6.6 % Final     Comment:       Normal:               <5.7%  Pre-Diabetic:       5.7% to 6.4%  Diabetic:             >6.4%  Diabetic Goal:     <7%   08/20/2021 6.2 4.5 - 6.6 % Final     Comment:       Normal:               <5.7%  Pre-Diabetic:       5.7% to 6.4%  Diabetic:             >6.4%  Diabetic Goal:     <7%      Takes an aspirin: yes  On a statin: no          Current Outpatient Medications:     aspirin (ECOTRIN) 81 MG EC tablet, Take 81 mg by mouth once daily., Disp: , Rfl:     amLODIPine (NORVASC) 5 MG tablet, Take 1 tablet (5 mg total) by mouth once daily., Disp: 90 tablet, Rfl: 1    estradioL (ESTRACE) 0.01 % (0.1 mg/gram) vaginal cream, Place 1 g vaginally twice a week., Disp: 42.5 g, Rfl: 1    metFORMIN (GLUCOPHAGE) 500 MG tablet, Take 1 tablet (500 mg total) by mouth 2 (two) " "times daily., Disp: 180 tablet, Rfl: 1     Past Medical History:   Diagnosis Date    Allergy     Diabetes mellitus, type 2     Hypertension         Family History   Problem Relation Age of Onset    Stroke Mother     Cancer Father     Heart disease Sister     Stroke Sister         Past Surgical History:   Procedure Laterality Date    HYSTERECTOMY      TUBAL LIGATION          Review of Systems   Constitutional:  Negative for fatigue and fever.   HENT:  Negative for ear pain, postnasal drip, rhinorrhea and sinus pressure/congestion.    Respiratory:  Negative for cough and shortness of breath.    Cardiovascular:  Negative for chest pain.   Gastrointestinal:  Negative for abdominal pain, diarrhea, nausea and vomiting.   Genitourinary:  Negative for dysuria.   Neurological:  Negative for headaches.      /79 (BP Location: Left arm, Patient Position: Sitting, BP Method: Large (Automatic))   Pulse 62   Temp 98 °F (36.7 °C) (Oral)   Resp 18   Ht 5' 2" (1.575 m)   Wt 67.1 kg (148 lb)   SpO2 98%   BMI 27.07 kg/m²      Physical Exam  HENT:      Head: Normocephalic and atraumatic.      Mouth/Throat:      Pharynx: Oropharynx is clear.   Cardiovascular:      Rate and Rhythm: Normal rate and regular rhythm.   Pulmonary:      Effort: Pulmonary effort is normal.      Breath sounds: Normal breath sounds.   Neurological:      Mental Status: She is alert and oriented to person, place, and time.   Psychiatric:         Mood and Affect: Mood normal.         Behavior: Behavior normal.        Assessment and Plan      ICD-10-CM ICD-9-CM   1. Essential hypertension, benign  I10 401.1   2. Type 2 diabetes mellitus without complication, with long-term current use of insulin  E11.9 250.00    Z79.4 V58.67   3. Post menopausal syndrome  N95.1 627.9        Problem List Items Addressed This Visit          Endocrine    Type 2 diabetes mellitus without complication, with long-term current use of insulin (Chronic)    Relevant Medications    " metFORMIN (GLUCOPHAGE) 500 MG tablet    Other Relevant Orders    Hemoglobin A1C (Completed)    Microalbumin/Creatinine Ratio, Urine (Completed)     Other Visit Diagnoses       Essential hypertension, benign    -  Primary    Relevant Medications    amLODIPine (NORVASC) 5 MG tablet    Post menopausal syndrome        Relevant Medications    estradioL (ESTRACE) 0.01 % (0.1 mg/gram) vaginal cream             Patient Instructions   Check glucose outside of clinic, records numbers, and bring to follow up visit.   Take medications as directed.   Eat a diabetic diet  Cardiovascular exercise at least 3 times per week.       Follow up in about 6 months (around 6/12/2023).

## 2022-12-15 LAB
LEFT EYE DM RETINOPATHY: NEGATIVE
RIGHT EYE DM RETINOPATHY: NEGATIVE

## 2022-12-16 PROBLEM — I10 HYPERTENSION: Chronic | Status: ACTIVE | Noted: 2021-08-20

## 2022-12-16 PROBLEM — Z79.4 TYPE 2 DIABETES MELLITUS WITHOUT COMPLICATION, WITH LONG-TERM CURRENT USE OF INSULIN: Chronic | Status: ACTIVE | Noted: 2021-08-20

## 2022-12-16 PROBLEM — E11.9 TYPE 2 DIABETES MELLITUS WITHOUT COMPLICATION, WITH LONG-TERM CURRENT USE OF INSULIN: Chronic | Status: ACTIVE | Noted: 2021-08-20

## 2023-01-19 DIAGNOSIS — Z12.31 ENCOUNTER FOR SCREENING MAMMOGRAM FOR MALIGNANT NEOPLASM OF BREAST: Primary | ICD-10-CM

## 2023-02-02 LAB — BCS RECOMMENDATION EXT: NORMAL

## 2023-02-21 PROBLEM — R35.0 INCREASED FREQUENCY OF URINATION: Status: ACTIVE | Noted: 2022-06-15

## 2023-02-21 PROBLEM — N81.0 URETHROCELE, FEMALE: Status: ACTIVE | Noted: 2022-06-15

## 2023-03-01 ENCOUNTER — OFFICE VISIT (OUTPATIENT)
Dept: FAMILY MEDICINE | Facility: CLINIC | Age: 78
End: 2023-03-01
Payer: MEDICARE

## 2023-03-01 VITALS
BODY MASS INDEX: 28.01 KG/M2 | WEIGHT: 152.19 LBS | RESPIRATION RATE: 18 BRPM | HEART RATE: 75 BPM | TEMPERATURE: 99 F | HEIGHT: 62 IN | DIASTOLIC BLOOD PRESSURE: 60 MMHG | SYSTOLIC BLOOD PRESSURE: 100 MMHG | OXYGEN SATURATION: 98 %

## 2023-03-01 DIAGNOSIS — I10 PRIMARY HYPERTENSION: ICD-10-CM

## 2023-03-01 DIAGNOSIS — E11.9 TYPE 2 DIABETES MELLITUS WITHOUT COMPLICATION, WITHOUT LONG-TERM CURRENT USE OF INSULIN: Chronic | ICD-10-CM

## 2023-03-01 DIAGNOSIS — Z00.00 ENCOUNTER FOR SUBSEQUENT ANNUAL WELLNESS VISIT (AWV) IN MEDICARE PATIENT: Primary | ICD-10-CM

## 2023-03-01 DIAGNOSIS — N81.0 URETHROCELE, FEMALE: Chronic | ICD-10-CM

## 2023-03-01 PROCEDURE — 1159F PR MEDICATION LIST DOCUMENTED IN MEDICAL RECORD: ICD-10-PCS | Mod: ,,, | Performed by: FAMILY MEDICINE

## 2023-03-01 PROCEDURE — 1101F PR PT FALLS ASSESS DOC 0-1 FALLS W/OUT INJ PAST YR: ICD-10-PCS | Mod: ,,, | Performed by: FAMILY MEDICINE

## 2023-03-01 PROCEDURE — 1126F AMNT PAIN NOTED NONE PRSNT: CPT | Mod: ,,, | Performed by: FAMILY MEDICINE

## 2023-03-01 PROCEDURE — G0439 PR MEDICARE ANNUAL WELLNESS SUBSEQUENT VISIT: ICD-10-PCS | Mod: ,,, | Performed by: FAMILY MEDICINE

## 2023-03-01 PROCEDURE — 3288F FALL RISK ASSESSMENT DOCD: CPT | Mod: ,,, | Performed by: FAMILY MEDICINE

## 2023-03-01 PROCEDURE — 1126F PR PAIN SEVERITY QUANTIFIED, NO PAIN PRESENT: ICD-10-PCS | Mod: ,,, | Performed by: FAMILY MEDICINE

## 2023-03-01 PROCEDURE — 3074F SYST BP LT 130 MM HG: CPT | Mod: ,,, | Performed by: FAMILY MEDICINE

## 2023-03-01 PROCEDURE — 3078F DIAST BP <80 MM HG: CPT | Mod: ,,, | Performed by: FAMILY MEDICINE

## 2023-03-01 PROCEDURE — 1160F RVW MEDS BY RX/DR IN RCRD: CPT | Mod: ,,, | Performed by: FAMILY MEDICINE

## 2023-03-01 PROCEDURE — G0439 PPPS, SUBSEQ VISIT: HCPCS | Mod: ,,, | Performed by: FAMILY MEDICINE

## 2023-03-01 PROCEDURE — 1160F PR REVIEW ALL MEDS BY PRESCRIBER/CLIN PHARMACIST DOCUMENTED: ICD-10-PCS | Mod: ,,, | Performed by: FAMILY MEDICINE

## 2023-03-01 PROCEDURE — 3074F PR MOST RECENT SYSTOLIC BLOOD PRESSURE < 130 MM HG: ICD-10-PCS | Mod: ,,, | Performed by: FAMILY MEDICINE

## 2023-03-01 PROCEDURE — 3078F PR MOST RECENT DIASTOLIC BLOOD PRESSURE < 80 MM HG: ICD-10-PCS | Mod: ,,, | Performed by: FAMILY MEDICINE

## 2023-03-01 PROCEDURE — 1101F PT FALLS ASSESS-DOCD LE1/YR: CPT | Mod: ,,, | Performed by: FAMILY MEDICINE

## 2023-03-01 PROCEDURE — 3288F PR FALLS RISK ASSESSMENT DOCUMENTED: ICD-10-PCS | Mod: ,,, | Performed by: FAMILY MEDICINE

## 2023-03-01 PROCEDURE — 1159F MED LIST DOCD IN RCRD: CPT | Mod: ,,, | Performed by: FAMILY MEDICINE

## 2023-03-01 RX ORDER — ASCORBIC ACID 125 MG
25 TABLET,CHEWABLE ORAL DAILY
COMMUNITY

## 2023-03-01 RX ORDER — ZINC GLUCONATE 50 MG
50 TABLET ORAL DAILY
COMMUNITY

## 2023-03-01 NOTE — PROGRESS NOTES
[facility]   [facility]      PATIENT NAME: Luisana Barrett   : 1945    AGE: 77 y.o. DATE: 2023   MRN: [id number]        Reason for Visit / Chief Complaint: Medicare AWV (Subsequent Medicare AWV )        Luisana Barrett presents for a Subsequent Medicare AWV today.     The following components were reviewed and updated:    Medical/Social/Family History:  Past Medical History:   Diagnosis Date    Allergy     Diabetes mellitus, type 2     Hypertension         Family History   Problem Relation Age of Onset    Stroke Mother     Cancer Father     Heart disease Sister     Stroke Sister         Social History     Tobacco Use   Smoking Status Never    Passive exposure: Never   Smokeless Tobacco Never      Social History     Substance and Sexual Activity   Alcohol Use Never       Family History   Problem Relation Age of Onset    Stroke Mother     Cancer Father     Heart disease Sister     Stroke Sister        Past Surgical History:   Procedure Laterality Date    HYSTERECTOMY      TUBAL LIGATION           Allergies and Current Medications   Review of patient's allergies indicates:  No Known Allergies    Current Outpatient Medications:     amLODIPine (NORVASC) 5 MG tablet, Take 1 tablet (5 mg total) by mouth once daily., Disp: 90 tablet, Rfl: 1    aspirin (ECOTRIN) 81 MG EC tablet, Take 81 mg by mouth once daily., Disp: , Rfl:     cholecalciferol, vitamin D3, 25 mcg (1,000 unit) Chew, Take 25 mcg by mouth once daily., Disp: , Rfl:     estradioL (ESTRACE) 0.01 % (0.1 mg/gram) vaginal cream, Place 1 g vaginally twice a week., Disp: 42.5 g, Rfl: 1    metFORMIN (GLUCOPHAGE) 500 MG tablet, Take 1 tablet (500 mg total) by mouth 2 (two) times daily., Disp: 180 tablet, Rfl: 1    VIT C-VIT D3-E-ZINC-ELDERBERRY ORAL, Take 1 tablet by mouth once daily., Disp: , Rfl:     zinc gluconate 50 mg tablet, Take 50 mg by mouth once daily., Disp: , Rfl:     Health Risk Assessment   Fall Risk: No   Advance  Directive:  Does not have an advanced directive. Verbal education and written education included in today's AVS.   Depression: PHQ9 score: 1    HTN: DASH diet, exercise, weight management, med compliance, home BP monitoring, and follow-up discussed.   T2DM: Diabetic diet, glucose monitoring, activity level, weight management, med compliance, and follow-up discussed.   Tobacco use: No  STI: No    Alcohol misuse: No   Statin Use: No    Opioid Risk Score         Value Time User    Opioid Risk Score  0 3/1/2023 11:33 AM Abbey Ma RN               Health Risk Assessment  What [age] your age?: 70-79  Are you male or female?: Female  During the past four weeks, how much have you been bothered by emotional problems such as feeling anxious, depressed, irritable, sad, or downhearted and blue?: Not at all  During the past five weeks, has your physical and/or emotional health limited your social activities with family, friends, neighbors, or groups?: Not at all  During the past four weeks, how much bodily pain have you generally had?: Mild pain  During the past four weeks, was someone available to help if you needed and wanted help?: Yes, as much as I wanted  During the past four weeks, what was the hardest physical activity you could do for at least two minutes?: Heavy  Can you get to places out of walking distance without help?  (For example, can you travel alone on buses or taxis, or drive your own car?): No  Can you go shopping for groceries or clothes without someone's help?: Yes  Can you prepare your own meals?: Yes  Can you do your own housework without help?: Yes  Because of any health problems, do you need the help of another person with your personal care needs such as eating, bathing, dressing, or getting around the house?: No  Can you handle your own money without help?: Yes  During the past four weeks, how would you rate your health in general?: Good  How have things been going for you during the past four  weeks?: Very well  Are you having difficulties driving your car?: No  Do you always fasten your seat belt when you are in a car?: Yes, usually  How often in the past four weeks have you been bothered by falling or dizzy when standing up?: Never  How often in the past four weeks have you been bothered by sexual problems?: Never  How often in the past four weeks have you been bothered by trouble eating well?: Never  How often in the past four weeks have you been bothered by teeth or denture problems?: Never  How often in the past four weeks have you been bothered with problems using the telephone?: Never  How often in the past four weeks have you been bothered by tiredness or fatigue?: Sometimes  Have you fallen two or more times in the past year?: No  Are you afraid of falling?: Yes  Are you a smoker?: No  During the past four weeks, how many drinks of wine, beer, or other alcoholic beverages did you have?: No alcohol at all  Do you exercise for about 20 minutes three or more days a week?: Yes, most of the time  Have you been given any information to help you with hazards in your house that might hurt you?: Yes  Have you been given any information to help you with keeping track of your medications?: Yes  How often do you have trouble taking medicines the way you've been told to take them?: I always take them as prescribed  How confident are you that you can control and manage most of your health problems?: Very confident  What is your race? (Check all that apply.):     Health Maintenance       Health Maintenance Topics with due status: Not Due       Topic Last Completion Date    TETANUS VACCINE 12/21/2017    Lipid Panel 07/08/2022    Diabetes Urine Screening 12/12/2022    Hemoglobin A1c 12/12/2022    Eye Exam 12/15/2022    DEXA Scan 12/15/2022     There are no preventive care reminders to display for this patient.    Incontinence  Bowel: No  Bladder: Yes    Lab results available in Epic or see dates  from [facility] above:   Lab Results   Component Value Date    CHOL 139 07/08/2022    CHOL 119 08/20/2021     Lab Results   Component Value Date    HDL 52 07/08/2022    HDL 45 08/20/2021     Lab Results   Component Value Date    LDLCALC 62 07/08/2022    LDLCALC 50 08/20/2021     Lab Results   Component Value Date    TRIG 123 07/08/2022    TRIG 119 08/20/2021     Lab Results   Component Value Date    CHOLHDL 2.7 07/08/2022    CHOLHDL 2.6 08/20/2021       Lab Results   Component Value Date    HGBA1C 6.4 12/12/2022       Sodium   Date Value Ref Range Status   07/08/2022 140 136 - 145 mmol/L Final     Potassium   Date Value Ref Range Status   07/08/2022 4.0 3.5 - 5.1 mmol/L Final     Chloride   Date Value Ref Range Status   07/08/2022 104 98 - 107 mmol/L Final     CO2   Date Value Ref Range Status   07/08/2022 28 21 - 32 mmol/L Final     Glucose   Date Value Ref Range Status   07/08/2022 103 74 - 106 mg/dL Final     BUN   Date Value Ref Range Status   07/08/2022 12 7 - 18 mg/dL Final     Creatinine   Date Value Ref Range Status   07/08/2022 0.98 0.55 - 1.02 mg/dL Final     Calcium   Date Value Ref Range Status   07/08/2022 9.8 8.5 - 10.1 mg/dL Final     Total Protein   Date Value Ref Range Status   07/08/2022 7.4 6.4 - 8.2 g/dL Final     Albumin   Date Value Ref Range Status   07/08/2022 4.0 3.5 - 5.0 g/dL Final     Bilirubin, Total   Date Value Ref Range Status   07/08/2022 0.6 0.0 - 1.2 mg/dL Final     Alk Phos   Date Value Ref Range Status   07/08/2022 85 55 - 142 U/L Final     AST   Date Value Ref Range Status   07/08/2022 14 (L) 15 - 37 U/L Final     ALT   Date Value Ref Range Status   07/08/2022 19 13 - 56 U/L Final     Anion Gap   Date Value Ref Range Status   07/08/2022 12 7 - 16 mmol/L Final     eGFR    Date Value Ref Range Status   08/20/2021 103 >=60 mL/min/1.73m² Final     eGFR   Date Value Ref Range Status   07/08/2022 59 (L) >=60 mL/min/1.73m² Final         No results found for: PSA (Delete this  "line if female pt)        Care Team   PCP:     Eye specialist: [facility]   Urologist: Dr.Kenneth Mcdermott-Kirwin   Podiatry-[facility], [place]       **See Completed Assessments for Annual Wellness visit within the encounter summary    The following assessments were completed & reviewed:  Depression Screening  Cognitive function Screening  Timed Get Up Test  Whisper Test  Vision Screen  Health Risk Assessment  Checklist of ADLs and IADLs      Objective  Vitals:    03/01/23 1118   BP: 100/60   Pulse: 75   Resp: 18   Temp: 98.9 °F (37.2 °C)   TempSrc: Oral   SpO2: 98%   Weight: 69 kg (152 lb 3.2 oz)   Height: 5' 2" (1.575 m)   PainSc: 0-No pain      Body mass index is 27.84 kg/m².  Ideal body weight: 50.1 kg (110 lb 7.2 oz)       Physical Exam      Assessment:     1. Encounter for subsequent annual wellness visit (AWV) in Medicare patient    2. BMI 27.0-27.9,adult    3. Primary hypertension    4. Type 2 diabetes mellitus without complication, without long-term current use of insulin    5. Urethrocele, female         Plan:    Referrals:        Advised to call office if does not hear from anyone with referral appt within 2-3 weeks to check on status of referral. Voiced understanding.      Discussed and provided with a screening schedule and personal prevention plan in accordance with USPSTF age appropriate recommendations and Medicare screening guidelines.   Education, counseling, and referrals were provided as needed.  After Visit Summary printed and given to patient which includes written education and a list of any referrals if indicated.     Education including advanced directives, diet, exercise, falls, and preventive health discussed with patient and patient verbalized understanding.      F/u plan for yearly AWV.    Signature:      "

## 2023-03-01 NOTE — PATIENT INSTRUCTIONS
Counseling and Referral of Other Preventative  (Italic type indicates deductible and co-insurance are waived)    Patient Name: Luisana Barrett  Today's Date: 3/1/2023    Health Maintenance         Date Due Completion Date    COVID-19 Vaccine (3 - Booster for Moderna series) 03/03/2023 (Originally 4/9/2021) 2/12/2021    Shingles Vaccine (1 of 2) 03/01/2024 (Originally 8/2/1995) ---    Hemoglobin A1c 06/12/2023 12/12/2022    Lipid Panel 07/08/2023 7/8/2022    Diabetes Urine Screening 12/12/2023 12/12/2022    Eye Exam 12/15/2023 12/15/2022    DEXA Scan 12/15/2025 12/15/2022    TETANUS VACCINE 12/21/2027 12/21/2017          No orders of the defined types were placed in this encounter.

## 2023-06-09 DIAGNOSIS — Z71.89 COMPLEX CARE COORDINATION: ICD-10-CM

## 2023-06-12 ENCOUNTER — OFFICE VISIT (OUTPATIENT)
Dept: FAMILY MEDICINE | Facility: CLINIC | Age: 78
End: 2023-06-12
Payer: MEDICARE

## 2023-06-12 VITALS
WEIGHT: 145.19 LBS | DIASTOLIC BLOOD PRESSURE: 78 MMHG | BODY MASS INDEX: 26.72 KG/M2 | TEMPERATURE: 99 F | SYSTOLIC BLOOD PRESSURE: 120 MMHG | OXYGEN SATURATION: 97 % | RESPIRATION RATE: 18 BRPM | HEIGHT: 62 IN | HEART RATE: 64 BPM

## 2023-06-12 DIAGNOSIS — E11.9 TYPE 2 DIABETES MELLITUS WITHOUT COMPLICATION, WITH LONG-TERM CURRENT USE OF INSULIN: ICD-10-CM

## 2023-06-12 DIAGNOSIS — Z79.4 TYPE 2 DIABETES MELLITUS WITHOUT COMPLICATION, WITH LONG-TERM CURRENT USE OF INSULIN: ICD-10-CM

## 2023-06-12 DIAGNOSIS — I10 ESSENTIAL HYPERTENSION, BENIGN: Primary | ICD-10-CM

## 2023-06-12 LAB
ALBUMIN SERPL BCP-MCNC: 3.9 G/DL (ref 3.5–5)
ALBUMIN/GLOB SERPL: 1 {RATIO}
ALP SERPL-CCNC: 70 U/L (ref 55–142)
ALT SERPL W P-5'-P-CCNC: 21 U/L (ref 13–56)
ANION GAP SERPL CALCULATED.3IONS-SCNC: 9 MMOL/L (ref 7–16)
AST SERPL W P-5'-P-CCNC: 21 U/L (ref 15–37)
BASOPHILS # BLD AUTO: 0.04 K/UL (ref 0–0.2)
BASOPHILS NFR BLD AUTO: 0.8 % (ref 0–1)
BILIRUB SERPL-MCNC: 0.5 MG/DL (ref ?–1.2)
BUN SERPL-MCNC: 10 MG/DL (ref 7–18)
BUN/CREAT SERPL: 11 (ref 6–20)
CALCIUM SERPL-MCNC: 9.7 MG/DL (ref 8.5–10.1)
CHLORIDE SERPL-SCNC: 108 MMOL/L (ref 98–107)
CHOLEST SERPL-MCNC: 162 MG/DL (ref 0–200)
CHOLEST/HDLC SERPL: 3 {RATIO}
CO2 SERPL-SCNC: 27 MMOL/L (ref 21–32)
CREAT SERPL-MCNC: 0.94 MG/DL (ref 0.55–1.02)
CREAT UR-MCNC: 167 MG/DL (ref 28–219)
DIFFERENTIAL METHOD BLD: ABNORMAL
EGFR (NO RACE VARIABLE) (RUSH/TITUS): 63 ML/MIN/1.73M2
EOSINOPHIL # BLD AUTO: 0.13 K/UL (ref 0–0.5)
EOSINOPHIL NFR BLD AUTO: 2.5 % (ref 1–4)
ERYTHROCYTE [DISTWIDTH] IN BLOOD BY AUTOMATED COUNT: 13.5 % (ref 11.5–14.5)
EST. AVERAGE GLUCOSE BLD GHB EST-MCNC: 127 MG/DL
GLOBULIN SER-MCNC: 3.8 G/DL (ref 2–4)
GLUCOSE SERPL-MCNC: 104 MG/DL (ref 74–106)
HBA1C MFR BLD HPLC: 6.4 % (ref 4.5–6.6)
HCT VFR BLD AUTO: 38.9 % (ref 38–47)
HDLC SERPL-MCNC: 54 MG/DL (ref 40–60)
HGB BLD-MCNC: 12.4 G/DL (ref 12–16)
IMM GRANULOCYTES # BLD AUTO: 0.01 K/UL (ref 0–0.04)
IMM GRANULOCYTES NFR BLD: 0.2 % (ref 0–0.4)
LDLC SERPL CALC-MCNC: 90 MG/DL
LDLC/HDLC SERPL: 1.7 {RATIO}
LYMPHOCYTES # BLD AUTO: 2.88 K/UL (ref 1–4.8)
LYMPHOCYTES NFR BLD AUTO: 55.9 % (ref 27–41)
MCH RBC QN AUTO: 26.7 PG (ref 27–31)
MCHC RBC AUTO-ENTMCNC: 31.9 G/DL (ref 32–36)
MCV RBC AUTO: 83.8 FL (ref 80–96)
MICROALBUMIN UR-MCNC: 0.9 MG/DL (ref 0–2.8)
MICROALBUMIN/CREAT RATIO PNL UR: 5.4 MG/G (ref 0–30)
MONOCYTES # BLD AUTO: 0.43 K/UL (ref 0–0.8)
MONOCYTES NFR BLD AUTO: 8.3 % (ref 2–6)
MPC BLD CALC-MCNC: 11.6 FL (ref 9.4–12.4)
NEUTROPHILS # BLD AUTO: 1.66 K/UL (ref 1.8–7.7)
NEUTROPHILS NFR BLD AUTO: 32.3 % (ref 53–65)
NONHDLC SERPL-MCNC: 108 MG/DL
NRBC # BLD AUTO: 0 X10E3/UL
NRBC, AUTO (.00): 0 %
PLATELET # BLD AUTO: 271 K/UL (ref 150–400)
POTASSIUM SERPL-SCNC: 3.5 MMOL/L (ref 3.5–5.1)
PROT SERPL-MCNC: 7.7 G/DL (ref 6.4–8.2)
RBC # BLD AUTO: 4.64 M/UL (ref 4.2–5.4)
SODIUM SERPL-SCNC: 140 MMOL/L (ref 136–145)
TRIGL SERPL-MCNC: 92 MG/DL (ref 35–150)
VLDLC SERPL-MCNC: 18 MG/DL
WBC # BLD AUTO: 5.15 K/UL (ref 4.5–11)

## 2023-06-12 PROCEDURE — 80061 LIPID PANEL: ICD-10-PCS | Mod: ,,, | Performed by: CLINICAL MEDICAL LABORATORY

## 2023-06-12 PROCEDURE — 1160F PR REVIEW ALL MEDS BY PRESCRIBER/CLIN PHARMACIST DOCUMENTED: ICD-10-PCS | Mod: ,,, | Performed by: FAMILY MEDICINE

## 2023-06-12 PROCEDURE — 1101F PT FALLS ASSESS-DOCD LE1/YR: CPT | Mod: ,,, | Performed by: FAMILY MEDICINE

## 2023-06-12 PROCEDURE — 82570 ASSAY OF URINE CREATININE: CPT | Mod: ,,, | Performed by: CLINICAL MEDICAL LABORATORY

## 2023-06-12 PROCEDURE — 82043 MICROALBUMIN / CREATININE RATIO URINE: ICD-10-PCS | Mod: ,,, | Performed by: CLINICAL MEDICAL LABORATORY

## 2023-06-12 PROCEDURE — 1126F PR PAIN SEVERITY QUANTIFIED, NO PAIN PRESENT: ICD-10-PCS | Mod: ,,, | Performed by: FAMILY MEDICINE

## 2023-06-12 PROCEDURE — 99214 OFFICE O/P EST MOD 30 MIN: CPT | Mod: ,,, | Performed by: FAMILY MEDICINE

## 2023-06-12 PROCEDURE — 82570 MICROALBUMIN / CREATININE RATIO URINE: ICD-10-PCS | Mod: ,,, | Performed by: CLINICAL MEDICAL LABORATORY

## 2023-06-12 PROCEDURE — 80061 LIPID PANEL: CPT | Mod: ,,, | Performed by: CLINICAL MEDICAL LABORATORY

## 2023-06-12 PROCEDURE — 99214 PR OFFICE/OUTPT VISIT, EST, LEVL IV, 30-39 MIN: ICD-10-PCS | Mod: ,,, | Performed by: FAMILY MEDICINE

## 2023-06-12 PROCEDURE — 3288F PR FALLS RISK ASSESSMENT DOCUMENTED: ICD-10-PCS | Mod: ,,, | Performed by: FAMILY MEDICINE

## 2023-06-12 PROCEDURE — 85025 COMPLETE CBC W/AUTO DIFF WBC: CPT | Mod: ,,, | Performed by: CLINICAL MEDICAL LABORATORY

## 2023-06-12 PROCEDURE — 1159F PR MEDICATION LIST DOCUMENTED IN MEDICAL RECORD: ICD-10-PCS | Mod: ,,, | Performed by: FAMILY MEDICINE

## 2023-06-12 PROCEDURE — 80053 COMPREHENSIVE METABOLIC PANEL: ICD-10-PCS | Mod: ,,, | Performed by: CLINICAL MEDICAL LABORATORY

## 2023-06-12 PROCEDURE — 85025 CBC WITH DIFFERENTIAL: ICD-10-PCS | Mod: ,,, | Performed by: CLINICAL MEDICAL LABORATORY

## 2023-06-12 PROCEDURE — 83036 HEMOGLOBIN GLYCOSYLATED A1C: CPT | Mod: ,,, | Performed by: CLINICAL MEDICAL LABORATORY

## 2023-06-12 PROCEDURE — 1160F RVW MEDS BY RX/DR IN RCRD: CPT | Mod: ,,, | Performed by: FAMILY MEDICINE

## 2023-06-12 PROCEDURE — 82043 UR ALBUMIN QUANTITATIVE: CPT | Mod: ,,, | Performed by: CLINICAL MEDICAL LABORATORY

## 2023-06-12 PROCEDURE — 83036 HEMOGLOBIN A1C: ICD-10-PCS | Mod: ,,, | Performed by: CLINICAL MEDICAL LABORATORY

## 2023-06-12 PROCEDURE — 3074F SYST BP LT 130 MM HG: CPT | Mod: ,,, | Performed by: FAMILY MEDICINE

## 2023-06-12 PROCEDURE — 80053 COMPREHEN METABOLIC PANEL: CPT | Mod: ,,, | Performed by: CLINICAL MEDICAL LABORATORY

## 2023-06-12 PROCEDURE — 3074F PR MOST RECENT SYSTOLIC BLOOD PRESSURE < 130 MM HG: ICD-10-PCS | Mod: ,,, | Performed by: FAMILY MEDICINE

## 2023-06-12 PROCEDURE — 1101F PR PT FALLS ASSESS DOC 0-1 FALLS W/OUT INJ PAST YR: ICD-10-PCS | Mod: ,,, | Performed by: FAMILY MEDICINE

## 2023-06-12 PROCEDURE — 1159F MED LIST DOCD IN RCRD: CPT | Mod: ,,, | Performed by: FAMILY MEDICINE

## 2023-06-12 PROCEDURE — 1126F AMNT PAIN NOTED NONE PRSNT: CPT | Mod: ,,, | Performed by: FAMILY MEDICINE

## 2023-06-12 PROCEDURE — 3078F PR MOST RECENT DIASTOLIC BLOOD PRESSURE < 80 MM HG: ICD-10-PCS | Mod: ,,, | Performed by: FAMILY MEDICINE

## 2023-06-12 PROCEDURE — 3288F FALL RISK ASSESSMENT DOCD: CPT | Mod: ,,, | Performed by: FAMILY MEDICINE

## 2023-06-12 PROCEDURE — 3078F DIAST BP <80 MM HG: CPT | Mod: ,,, | Performed by: FAMILY MEDICINE

## 2023-06-12 RX ORDER — AMLODIPINE BESYLATE 5 MG/1
5 TABLET ORAL DAILY
Qty: 90 TABLET | Refills: 1 | Status: SHIPPED | OUTPATIENT
Start: 2023-06-12 | End: 2023-12-11 | Stop reason: SDUPTHER

## 2023-06-12 RX ORDER — METFORMIN HYDROCHLORIDE 500 MG/1
500 TABLET ORAL 2 TIMES DAILY
Qty: 180 TABLET | Refills: 1 | Status: SHIPPED | OUTPATIENT
Start: 2023-06-12 | End: 2023-09-15

## 2023-06-12 NOTE — PROGRESS NOTES
New Clinic Note    Luisana Barrett is a 77 y.o. female     CC:   Chief Complaint   Patient presents with    Follow-up     Here for 6 month check up.     Medication Refill        Subjective    History of Present Illness HPI   Patient is for evaluation of chronic medical problems. Patient needs refills. Luisana  is tolerating medications well without side effects.       Current Outpatient Medications:     aspirin (ECOTRIN) 81 MG EC tablet, Take 81 mg by mouth once daily., Disp: , Rfl:     cholecalciferol, vitamin D3, 25 mcg (1,000 unit) Chew, Take 25 mcg by mouth once daily., Disp: , Rfl:     VIT C-VIT D3-E-ZINC-ELDERBERRY ORAL, Take 1 tablet by mouth once daily., Disp: , Rfl:     zinc gluconate 50 mg tablet, Take 50 mg by mouth once daily., Disp: , Rfl:     amLODIPine (NORVASC) 5 MG tablet, Take 1 tablet (5 mg total) by mouth once daily., Disp: 90 tablet, Rfl: 1    estradioL (ESTRACE) 0.01 % (0.1 mg/gram) vaginal cream, Place 1 g vaginally twice a week., Disp: 42.5 g, Rfl: 1    metFORMIN (GLUCOPHAGE) 500 MG tablet, Take 1 tablet (500 mg total) by mouth 2 (two) times daily., Disp: 180 tablet, Rfl: 1     Past Medical History:   Diagnosis Date    Allergy     Diabetes mellitus, type 2     Hypertension         Family History   Problem Relation Age of Onset    Stroke Mother     Cancer Father     Heart disease Sister     Stroke Sister         Past Surgical History:   Procedure Laterality Date    HYSTERECTOMY      TUBAL LIGATION          Review of Systems   Constitutional:  Negative for fatigue and fever.   HENT:  Negative for ear pain, postnasal drip, rhinorrhea and sinus pressure/congestion.    Respiratory:  Negative for cough and shortness of breath.    Cardiovascular:  Negative for chest pain.   Gastrointestinal:  Negative for abdominal pain, diarrhea, nausea and vomiting.   Genitourinary:  Negative for dysuria.   Neurological:  Negative for headaches.      /78 (BP Location: Right arm, Patient Position: Sitting, BP  "Method: Large (Automatic))   Pulse 64   Temp 98.7 °F (37.1 °C) (Oral)   Resp 18   Ht 5' 2" (1.575 m)   Wt 65.9 kg (145 lb 3.2 oz)   SpO2 97%   BMI 26.56 kg/m²      Physical Exam  HENT:      Head: Normocephalic and atraumatic.   Cardiovascular:      Rate and Rhythm: Normal rate and regular rhythm.   Pulmonary:      Effort: Pulmonary effort is normal.      Breath sounds: Normal breath sounds.   Neurological:      Mental Status: She is alert and oriented to person, place, and time.   Psychiatric:         Mood and Affect: Mood normal.         Behavior: Behavior normal.        Assessment and Plan      ICD-10-CM ICD-9-CM   1. Essential hypertension, benign  I10 401.1   2. Type 2 diabetes mellitus without complication, with long-term current use of insulin  E11.9 250.00    Z79.4 V58.67   3. BMI 36.0-36.9,adult  Z68.36 V85.36        1. Essential hypertension, benign  The current medical regimen is effective;  continue present plan and medications.  -     amLODIPine (NORVASC) 5 MG tablet; Take 1 tablet (5 mg total) by mouth once daily.  Dispense: 90 tablet; Refill: 1  -     Comprehensive Metabolic Panel; Future; Expected date: 06/12/2023  -     CBC Auto Differential; Future; Expected date: 06/12/2023  -     Lipid Panel; Future; Expected date: 06/12/2023    2. Type 2 diabetes mellitus without complication, with long-term current use of insulin  The current medical regimen is effective;  continue present plan and medications.  -     metFORMIN (GLUCOPHAGE) 500 MG tablet; Take 1 tablet (500 mg total) by mouth 2 (two) times daily.  Dispense: 180 tablet; Refill: 1  -     Hemoglobin A1C; Future; Expected date: 06/12/2023  -     Microalbumin/Creatinine Ratio, Urine    3. BMI 36.0-36.9,adult  Diet and educational handouts given.         Follow up in about 6 months (around 12/12/2023).       "

## 2023-09-15 DIAGNOSIS — Z79.4 TYPE 2 DIABETES MELLITUS WITHOUT COMPLICATION, WITH LONG-TERM CURRENT USE OF INSULIN: ICD-10-CM

## 2023-09-15 DIAGNOSIS — E11.9 TYPE 2 DIABETES MELLITUS WITHOUT COMPLICATION, WITH LONG-TERM CURRENT USE OF INSULIN: ICD-10-CM

## 2023-09-15 RX ORDER — METFORMIN HYDROCHLORIDE 500 MG/1
500 TABLET ORAL 2 TIMES DAILY
Qty: 180 TABLET | Refills: 1 | Status: SHIPPED | OUTPATIENT
Start: 2023-09-15 | End: 2023-12-11 | Stop reason: SDUPTHER

## 2023-11-03 ENCOUNTER — CLINICAL SUPPORT (OUTPATIENT)
Dept: FAMILY MEDICINE | Facility: CLINIC | Age: 78
End: 2023-11-03
Payer: MEDICARE

## 2023-11-03 DIAGNOSIS — Z23 FLU VACCINE NEED: Primary | ICD-10-CM

## 2023-11-03 PROCEDURE — 90694 VACC AIIV4 NO PRSRV 0.5ML IM: CPT | Mod: ,,, | Performed by: FAMILY MEDICINE

## 2023-11-03 PROCEDURE — 90694 FLU VACCINE - QUADRIVALENT - ADJUVANTED: ICD-10-PCS | Mod: ,,, | Performed by: FAMILY MEDICINE

## 2023-11-03 PROCEDURE — G0008 FLU VACCINE - QUADRIVALENT - ADJUVANTED: ICD-10-PCS | Mod: ,,, | Performed by: FAMILY MEDICINE

## 2023-11-03 PROCEDURE — G0008 ADMIN INFLUENZA VIRUS VAC: HCPCS | Mod: ,,, | Performed by: FAMILY MEDICINE

## 2023-12-09 RX ORDER — TERBINAFINE HYDROCHLORIDE 250 MG/1
250 TABLET ORAL DAILY
COMMUNITY
End: 2023-12-11

## 2023-12-11 ENCOUNTER — OFFICE VISIT (OUTPATIENT)
Dept: FAMILY MEDICINE | Facility: CLINIC | Age: 78
End: 2023-12-11
Payer: MEDICARE

## 2023-12-11 VITALS
RESPIRATION RATE: 18 BRPM | OXYGEN SATURATION: 97 % | SYSTOLIC BLOOD PRESSURE: 120 MMHG | DIASTOLIC BLOOD PRESSURE: 71 MMHG | WEIGHT: 150 LBS | BODY MASS INDEX: 27.6 KG/M2 | HEIGHT: 62 IN | HEART RATE: 68 BPM | TEMPERATURE: 98 F

## 2023-12-11 DIAGNOSIS — Z79.4 TYPE 2 DIABETES MELLITUS WITHOUT COMPLICATION, WITH LONG-TERM CURRENT USE OF INSULIN: ICD-10-CM

## 2023-12-11 DIAGNOSIS — E11.9 TYPE 2 DIABETES MELLITUS WITHOUT COMPLICATION, WITH LONG-TERM CURRENT USE OF INSULIN: ICD-10-CM

## 2023-12-11 DIAGNOSIS — N95.1 POST MENOPAUSAL SYNDROME: ICD-10-CM

## 2023-12-11 DIAGNOSIS — I10 ESSENTIAL HYPERTENSION, BENIGN: ICD-10-CM

## 2023-12-11 LAB
EST. AVERAGE GLUCOSE BLD GHB EST-MCNC: 128 MG/DL
HBA1C MFR BLD HPLC: 6.1 % (ref 4.5–6.6)

## 2023-12-11 PROCEDURE — 83036 HEMOGLOBIN A1C: ICD-10-PCS | Mod: ,,, | Performed by: CLINICAL MEDICAL LABORATORY

## 2023-12-11 PROCEDURE — 1126F PR PAIN SEVERITY QUANTIFIED, NO PAIN PRESENT: ICD-10-PCS | Mod: ,,, | Performed by: FAMILY MEDICINE

## 2023-12-11 PROCEDURE — 3074F PR MOST RECENT SYSTOLIC BLOOD PRESSURE < 130 MM HG: ICD-10-PCS | Mod: ,,, | Performed by: FAMILY MEDICINE

## 2023-12-11 PROCEDURE — 3288F FALL RISK ASSESSMENT DOCD: CPT | Mod: ,,, | Performed by: FAMILY MEDICINE

## 2023-12-11 PROCEDURE — 3078F DIAST BP <80 MM HG: CPT | Mod: ,,, | Performed by: FAMILY MEDICINE

## 2023-12-11 PROCEDURE — 1101F PR PT FALLS ASSESS DOC 0-1 FALLS W/OUT INJ PAST YR: ICD-10-PCS | Mod: ,,, | Performed by: FAMILY MEDICINE

## 2023-12-11 PROCEDURE — 3078F PR MOST RECENT DIASTOLIC BLOOD PRESSURE < 80 MM HG: ICD-10-PCS | Mod: ,,, | Performed by: FAMILY MEDICINE

## 2023-12-11 PROCEDURE — 1159F PR MEDICATION LIST DOCUMENTED IN MEDICAL RECORD: ICD-10-PCS | Mod: ,,, | Performed by: FAMILY MEDICINE

## 2023-12-11 PROCEDURE — 99214 PR OFFICE/OUTPT VISIT, EST, LEVL IV, 30-39 MIN: ICD-10-PCS | Mod: ,,, | Performed by: FAMILY MEDICINE

## 2023-12-11 PROCEDURE — 99214 OFFICE O/P EST MOD 30 MIN: CPT | Mod: ,,, | Performed by: FAMILY MEDICINE

## 2023-12-11 PROCEDURE — 3074F SYST BP LT 130 MM HG: CPT | Mod: ,,, | Performed by: FAMILY MEDICINE

## 2023-12-11 PROCEDURE — 3288F PR FALLS RISK ASSESSMENT DOCUMENTED: ICD-10-PCS | Mod: ,,, | Performed by: FAMILY MEDICINE

## 2023-12-11 PROCEDURE — 1160F RVW MEDS BY RX/DR IN RCRD: CPT | Mod: ,,, | Performed by: FAMILY MEDICINE

## 2023-12-11 PROCEDURE — 83036 HEMOGLOBIN GLYCOSYLATED A1C: CPT | Mod: ,,, | Performed by: CLINICAL MEDICAL LABORATORY

## 2023-12-11 PROCEDURE — 1126F AMNT PAIN NOTED NONE PRSNT: CPT | Mod: ,,, | Performed by: FAMILY MEDICINE

## 2023-12-11 PROCEDURE — 1101F PT FALLS ASSESS-DOCD LE1/YR: CPT | Mod: ,,, | Performed by: FAMILY MEDICINE

## 2023-12-11 PROCEDURE — 1160F PR REVIEW ALL MEDS BY PRESCRIBER/CLIN PHARMACIST DOCUMENTED: ICD-10-PCS | Mod: ,,, | Performed by: FAMILY MEDICINE

## 2023-12-11 PROCEDURE — 1159F MED LIST DOCD IN RCRD: CPT | Mod: ,,, | Performed by: FAMILY MEDICINE

## 2023-12-11 RX ORDER — AMLODIPINE BESYLATE 5 MG/1
5 TABLET ORAL DAILY
Qty: 90 TABLET | Refills: 1 | Status: SHIPPED | OUTPATIENT
Start: 2023-12-11

## 2023-12-11 RX ORDER — METFORMIN HYDROCHLORIDE 500 MG/1
500 TABLET ORAL 2 TIMES DAILY
Qty: 180 TABLET | Refills: 1 | Status: SHIPPED | OUTPATIENT
Start: 2023-12-11

## 2023-12-11 RX ORDER — ESTRADIOL 0.1 MG/G
1 CREAM VAGINAL
Qty: 42.5 G | Refills: 1 | Status: SHIPPED | OUTPATIENT
Start: 2023-12-11

## 2023-12-11 NOTE — PROGRESS NOTES
"New Clinic Note    Luisana Barrett is a 78 y.o. female     CC:   Chief Complaint   Patient presents with    Annual Exam     Patient stated she is here for her 6 month general health evaluation, renewal of prescriptions sent to [facility], and is "fasting" for labs Complains of "itchy left ear" and stated she keeps it cleaned but it itches inside. Stated her A1C is due.     Hypertension    Diabetes     Last A1C was 6 months ago.     Medication Refill        Subjective    History of Present Illness HPI   Patient is for evaluation of chronic medical problems. Patient needs refills. Luisana  is tolerating medications well without side effects.       Current Outpatient Medications:     aspirin (ECOTRIN) 81 MG EC tablet, Take 81 mg by mouth once daily., Disp: , Rfl:     cholecalciferol, vitamin D3, 25 mcg (1,000 unit) Chew, Take 25 mcg by mouth once daily., Disp: , Rfl:     amLODIPine (NORVASC) 5 MG tablet, Take 1 tablet (5 mg total) by mouth once daily., Disp: 90 tablet, Rfl: 1    estradioL (ESTRACE) 0.01 % (0.1 mg/gram) vaginal cream, Place 1 g vaginally twice a week., Disp: 42.5 g, Rfl: 1    metFORMIN (GLUCOPHAGE) 500 MG tablet, Take 1 tablet (500 mg total) by mouth 2 (two) times daily., Disp: 180 tablet, Rfl: 1    VIT C-VIT D3-E-ZINC-ELDERBERRY ORAL, Take 1 tablet by mouth once daily., Disp: , Rfl:     zinc gluconate 50 mg tablet, Take 50 mg by mouth once daily., Disp: , Rfl:      Past Medical History:   Diagnosis Date    Allergy     Diabetes mellitus, type 2     Hypertension         Family History   Problem Relation Age of Onset    Stroke Mother     Cancer Father     Heart disease Sister     Stroke Sister         Past Surgical History:   Procedure Laterality Date    HYSTERECTOMY      TUBAL LIGATION          Review of Systems   Constitutional:  Negative for fatigue and fever.   HENT:  Negative for ear pain, postnasal drip, rhinorrhea and sinus pressure/congestion.    Respiratory:  Negative for cough and shortness of " "breath.    Cardiovascular:  Negative for chest pain.   Gastrointestinal:  Negative for abdominal pain, diarrhea, nausea and vomiting.   Genitourinary:  Negative for dysuria.   Neurological:  Negative for headaches.        /71 (BP Location: Right arm, Patient Position: Sitting, BP Method: Large (Automatic))   Pulse 68   Temp 98 °F (36.7 °C) (Oral)   Resp 18   Ht 5' 2" (1.575 m)   Wt 68 kg (150 lb)   SpO2 97%   BMI 27.44 kg/m²      Physical Exam  HENT:      Head: Normocephalic and atraumatic.   Cardiovascular:      Rate and Rhythm: Normal rate and regular rhythm.   Pulmonary:      Effort: Pulmonary effort is normal.      Breath sounds: Normal breath sounds.   Neurological:      Mental Status: She is alert and oriented to person, place, and time.   Psychiatric:         Mood and Affect: Mood normal.         Behavior: Behavior normal.          Assessment and Plan      ICD-10-CM ICD-9-CM   1. Type 2 diabetes mellitus without complication, with long-term current use of insulin  E11.9 250.00    Z79.4 V58.67   2. Post menopausal syndrome  N95.1 627.9   3. Essential hypertension, benign  I10 401.1        1. Type 2 diabetes mellitus without complication, with long-term current use of insulin  The current medical regimen is effective;  continue present plan and medications.  -     metFORMIN (GLUCOPHAGE) 500 MG tablet; Take 1 tablet (500 mg total) by mouth 2 (two) times daily.  Dispense: 180 tablet; Refill: 1  -     Hemoglobin A1C; Future; Expected date: 12/11/2023    2. Post menopausal syndrome  The current medical regimen is effective;  continue present plan and medications.  -     estradioL (ESTRACE) 0.01 % (0.1 mg/gram) vaginal cream; Place 1 g vaginally twice a week.  Dispense: 42.5 g; Refill: 1    3. Essential hypertension, benign  The current medical regimen is effective;  continue present plan and medications.  -     amLODIPine (NORVASC) 5 MG tablet; Take 1 tablet (5 mg total) by mouth once daily.  Dispense: " 90 tablet; Refill: 1        Follow up in about 6 months (around 6/11/2024).

## 2023-12-21 LAB
LEFT EYE DM RETINOPATHY: NEGATIVE
RIGHT EYE DM RETINOPATHY: NEGATIVE

## 2024-01-09 DIAGNOSIS — Z71.89 COMPLEX CARE COORDINATION: ICD-10-CM

## 2024-02-27 NOTE — PROGRESS NOTES
"  Luisana Barrett presented for a  Medicare AWV and comprehensive Health Risk Assessment today. The following components were reviewed and updated:    Medical history  Family History  Social history  Allergies and Current Medications  Health Risk Assessment  Health Maintenance  Care Team         ** See Completed Assessments for Annual Wellness Visit within the encounter summary.**         The following assessments were completed:  Living Situation  CAGE  Depression Screening  Timed Get Up and Go  Whisper Test  Cognitive Function Screening  Nutrition Screening  ADL Screening  PAQ Screening        Vitals:    03/01/24 1150   BP: 130/74   Pulse: 64   Resp: 14   Temp: 98.5 °F (36.9 °C)   SpO2: 96%   Weight: 67.2 kg (148 lb 3.2 oz)   Height: 5' 2" (1.575 m)     Body mass index is 27.11 kg/m².  Physical Exam  HENT:      Head: Normocephalic and atraumatic.   Cardiovascular:      Rate and Rhythm: Normal rate and regular rhythm.   Pulmonary:      Effort: Pulmonary effort is normal.      Breath sounds: Normal breath sounds.   Neurological:      Mental Status: She is alert and oriented to person, place, and time.   Psychiatric:         Mood and Affect: Mood normal.         Behavior: Behavior normal.               Diagnoses and health risks identified today and associated recommendations/orders:    1. Encounter for subsequent annual wellness visit (AWV) in Medicare patient    2. Essential hypertension, benign  The current medical regimen is effective;  continue present plan and medications.  - Comprehensive Metabolic Panel; Future  - Microalbumin/Creatinine Ratio, Urine    3. Type 2 diabetes mellitus without complication, with long-term current use of insulin  The current medical regimen is effective;  continue present plan and medications.    4. BMI 27.0-27.9,adult  Diet and educational handouts given.    5. Urethrocele, female  Stable      Provided Luisana with a 5-10 year written screening schedule and personal prevention plan. " Recommendations were developed using the USPSTF age appropriate recommendations. Education, counseling, and referrals were provided as needed. After Visit Summary printed and given to patient which includes a list of additional screenings\tests needed.    Follow up for 1 year for Annual Wellness Visit.      I offered to discuss advanced care planning, including how to pick a person who would make decisions for you if you were unable to make them for yourself, called a health care power of , and what kind of decisions you might make such as use of life sustaining treatments such as ventilators and tube feeding when faced with a life limiting illness recorded on a living will that they will need to know. (How you want to be cared for as you near the end of your natural life)     X Patient is interested in learning more about how to make advanced directives.  I provided them paperwork and offered to discuss this with them.

## 2024-03-01 ENCOUNTER — OFFICE VISIT (OUTPATIENT)
Dept: FAMILY MEDICINE | Facility: CLINIC | Age: 79
End: 2024-03-01
Payer: MEDICARE

## 2024-03-01 VITALS
SYSTOLIC BLOOD PRESSURE: 130 MMHG | OXYGEN SATURATION: 96 % | TEMPERATURE: 99 F | WEIGHT: 148.19 LBS | DIASTOLIC BLOOD PRESSURE: 74 MMHG | RESPIRATION RATE: 14 BRPM | BODY MASS INDEX: 27.27 KG/M2 | HEART RATE: 64 BPM | HEIGHT: 62 IN

## 2024-03-01 DIAGNOSIS — I10 ESSENTIAL HYPERTENSION, BENIGN: ICD-10-CM

## 2024-03-01 DIAGNOSIS — E11.9 TYPE 2 DIABETES MELLITUS WITHOUT COMPLICATION, WITH LONG-TERM CURRENT USE OF INSULIN: ICD-10-CM

## 2024-03-01 DIAGNOSIS — Z79.4 TYPE 2 DIABETES MELLITUS WITHOUT COMPLICATION, WITH LONG-TERM CURRENT USE OF INSULIN: ICD-10-CM

## 2024-03-01 DIAGNOSIS — Z00.00 ENCOUNTER FOR SUBSEQUENT ANNUAL WELLNESS VISIT (AWV) IN MEDICARE PATIENT: Primary | ICD-10-CM

## 2024-03-01 DIAGNOSIS — N81.0 URETHROCELE, FEMALE: Chronic | ICD-10-CM

## 2024-03-01 LAB
ALBUMIN SERPL BCP-MCNC: 4.1 G/DL (ref 3.5–5)
ALBUMIN/GLOB SERPL: 1.1 {RATIO}
ALP SERPL-CCNC: 73 U/L (ref 55–142)
ALT SERPL W P-5'-P-CCNC: 23 U/L (ref 13–56)
ANION GAP SERPL CALCULATED.3IONS-SCNC: 9 MMOL/L (ref 7–16)
AST SERPL W P-5'-P-CCNC: 25 U/L (ref 15–37)
BILIRUB SERPL-MCNC: 0.4 MG/DL (ref ?–1.2)
BUN SERPL-MCNC: 10 MG/DL (ref 7–18)
BUN/CREAT SERPL: 11 (ref 6–20)
CALCIUM SERPL-MCNC: 10.2 MG/DL (ref 8.5–10.1)
CHLORIDE SERPL-SCNC: 110 MMOL/L (ref 98–107)
CO2 SERPL-SCNC: 27 MMOL/L (ref 21–32)
CREAT SERPL-MCNC: 0.88 MG/DL (ref 0.55–1.02)
CREAT UR-MCNC: 115 MG/DL (ref 28–219)
EGFR (NO RACE VARIABLE) (RUSH/TITUS): 67 ML/MIN/1.73M2
GLOBULIN SER-MCNC: 3.9 G/DL (ref 2–4)
GLUCOSE SERPL-MCNC: 73 MG/DL (ref 74–106)
MICROALBUMIN UR-MCNC: 0.9 MG/DL (ref 0–2.8)
MICROALBUMIN/CREAT RATIO PNL UR: 7.8 MG/G (ref 0–30)
POTASSIUM SERPL-SCNC: 3.9 MMOL/L (ref 3.5–5.1)
PROT SERPL-MCNC: 8 G/DL (ref 6.4–8.2)
SODIUM SERPL-SCNC: 142 MMOL/L (ref 136–145)

## 2024-03-01 PROCEDURE — 1126F AMNT PAIN NOTED NONE PRSNT: CPT | Mod: ,,, | Performed by: FAMILY MEDICINE

## 2024-03-01 PROCEDURE — 3288F FALL RISK ASSESSMENT DOCD: CPT | Mod: ,,, | Performed by: FAMILY MEDICINE

## 2024-03-01 PROCEDURE — 80053 COMPREHEN METABOLIC PANEL: CPT | Mod: ,,, | Performed by: CLINICAL MEDICAL LABORATORY

## 2024-03-01 PROCEDURE — 3078F DIAST BP <80 MM HG: CPT | Mod: ,,, | Performed by: FAMILY MEDICINE

## 2024-03-01 PROCEDURE — 82043 UR ALBUMIN QUANTITATIVE: CPT | Mod: ,,, | Performed by: CLINICAL MEDICAL LABORATORY

## 2024-03-01 PROCEDURE — 82570 ASSAY OF URINE CREATININE: CPT | Mod: ,,, | Performed by: CLINICAL MEDICAL LABORATORY

## 2024-03-01 PROCEDURE — 3075F SYST BP GE 130 - 139MM HG: CPT | Mod: ,,, | Performed by: FAMILY MEDICINE

## 2024-03-01 PROCEDURE — 1159F MED LIST DOCD IN RCRD: CPT | Mod: ,,, | Performed by: FAMILY MEDICINE

## 2024-03-01 PROCEDURE — G0439 PPPS, SUBSEQ VISIT: HCPCS | Mod: ,,, | Performed by: FAMILY MEDICINE

## 2024-03-01 PROCEDURE — 1170F FXNL STATUS ASSESSED: CPT | Mod: ,,, | Performed by: FAMILY MEDICINE

## 2024-03-01 RX ORDER — CYCLOSPORINE 0.5 MG/ML
1 EMULSION OPHTHALMIC 2 TIMES DAILY
COMMUNITY
Start: 2023-12-21

## 2024-03-01 NOTE — PATIENT INSTRUCTIONS
Counseling and Referral of Other Preventative  (Italic type indicates deductible and co-insurance are waived)    Patient Name: Luisana Barrett  Today's Date: 3/1/2024    Health Maintenance       Date Due Completion Date    RSV Vaccine (Age 60+ and Pregnant patients) (1 - 1-dose 60+ series) Never done ---    COVID-19 Vaccine (3 - 2023-24 season) 09/01/2023 2/12/2021    Eye Exam 12/15/2023 12/15/2022    Shingles Vaccine (1 of 2) 03/01/2024 (Originally 8/2/1995) ---    Hemoglobin A1c 06/11/2024 12/11/2023    Diabetes Urine Screening 06/12/2024 6/12/2023    Lipid Panel 06/12/2024 6/12/2023    DEXA Scan 12/15/2025 12/15/2022    TETANUS VACCINE 12/21/2027 12/21/2017        No orders of the defined types were placed in this encounter.    The following information is provided to all patients.  This information is to help you find resources for any of the problems found today that may be affecting your health:                  Living healthy guide: ms.gov    Understanding Diabetes: www.diabetes.org      Eating healthy: www.cdc.gov/healthyweight      CDC home safety checklist: www.cdc.gov/steadi/patient.html      Agency on Aging: ms.gov    Alcoholics anonymous (AA): www.aa.org      Physical Activity: www.rosalinda.nih.gov/ij4dzex      Tobacco use: ms.gov

## 2024-03-01 NOTE — Clinical Note
Please get copy of the her last eye appointment with Dr. Seamus Pereira in [facility]. Thanks

## 2024-03-04 ENCOUNTER — PATIENT OUTREACH (OUTPATIENT)
Dept: ADMINISTRATIVE | Facility: HOSPITAL | Age: 79
End: 2024-03-04

## 2024-03-04 NOTE — PROGRESS NOTES
Population Health Chart Review & Patient Outreach Details    Updates Requested / Reviewed:  [x]  Care Team Updated    Health Maintenance Topics Addressed and Outreach Outcomes / Actions Taken:  Diabetic Eye Exam [x] MONCHO sent to [facility].

## 2024-03-04 NOTE — LETTER
AUTHORIZATION FOR RELEASE OF   CONFIDENTIAL INFORMATION    Dear [facility],    We are seeing Luisana Barrett, date of birth 1945, in the clinic at [facility]. Mikayla Rosas MD is the patient's PCP. Luisana Barrett has an outstanding lab/procedure at the time we reviewed her chart. In order to help keep her health information updated, she has authorized us to request the following medical record(s):        (  )  MAMMOGRAM                                      (  )  COLONOSCOPY      (  )  PAP SMEAR                                          (  )  OUTSIDE LAB RESULTS     (  )  DEXA SCAN                                          ( x )  EYE EXAM            (  )  FOOT EXAM                                          (  )  ENTIRE RECORD     (  )  OUTSIDE IMMUNIZATIONS                 (  )  _______________         Please fax records to Mahamed Jimenez LPN Care Coordinator at [phone].      If you have any questions, please contact Mahamed at [phone].           Patient Name: Luisana Barrett  : 1945  Patient Phone #: [phone]

## 2024-03-06 DIAGNOSIS — Z12.31 SCREENING MAMMOGRAM FOR BREAST CANCER: Primary | ICD-10-CM

## 2024-03-20 LAB — BCS RECOMMENDATION EXT: NORMAL

## 2024-04-01 ENCOUNTER — PATIENT OUTREACH (OUTPATIENT)
Dept: ADMINISTRATIVE | Facility: HOSPITAL | Age: 79
End: 2024-04-01

## 2024-04-01 NOTE — PROGRESS NOTES
Population Health Chart Review & Patient Outreach Details    Updates Requested / Reviewed:  [x]  Care Team Updated    Health Maintenance Topics Addressed and Outreach Outcomes / Actions Taken:  Breast Cancer Screening [x]  Updated with March 2024 Mammogram ([facility]). History Updated.

## 2024-04-02 ENCOUNTER — PATIENT OUTREACH (OUTPATIENT)
Dept: ADMINISTRATIVE | Facility: HOSPITAL | Age: 79
End: 2024-04-02

## 2024-04-02 NOTE — PROGRESS NOTES
Population Health Chart Review & Patient Outreach Details    Updates Requested / Reviewed:  [x]  Care Team Updated    Health Maintenance Topics Addressed and Outreach Outcomes / Actions Taken:  Diabetic Eye Exam [x] HM Updated with December 2023 Eye Exam (Dr. Pereira). History Updated.

## 2024-04-29 DIAGNOSIS — E11.9 TYPE 2 DIABETES MELLITUS WITHOUT COMPLICATION, WITH LONG-TERM CURRENT USE OF INSULIN: ICD-10-CM

## 2024-04-29 DIAGNOSIS — Z79.4 TYPE 2 DIABETES MELLITUS WITHOUT COMPLICATION, WITH LONG-TERM CURRENT USE OF INSULIN: ICD-10-CM

## 2024-05-01 RX ORDER — METFORMIN HYDROCHLORIDE 500 MG/1
500 TABLET ORAL 2 TIMES DAILY
Qty: 180 TABLET | Refills: 3 | Status: SHIPPED | OUTPATIENT
Start: 2024-05-01

## 2024-06-12 ENCOUNTER — OFFICE VISIT (OUTPATIENT)
Dept: FAMILY MEDICINE | Facility: CLINIC | Age: 79
End: 2024-06-12
Payer: MEDICARE

## 2024-06-12 VITALS
BODY MASS INDEX: 26.5 KG/M2 | WEIGHT: 144 LBS | HEIGHT: 62 IN | HEART RATE: 60 BPM | DIASTOLIC BLOOD PRESSURE: 71 MMHG | SYSTOLIC BLOOD PRESSURE: 114 MMHG | OXYGEN SATURATION: 97 % | TEMPERATURE: 97 F | RESPIRATION RATE: 18 BRPM

## 2024-06-12 DIAGNOSIS — E11.9 TYPE 2 DIABETES MELLITUS WITHOUT COMPLICATION, WITHOUT LONG-TERM CURRENT USE OF INSULIN: ICD-10-CM

## 2024-06-12 DIAGNOSIS — I10 ESSENTIAL HYPERTENSION, BENIGN: Primary | ICD-10-CM

## 2024-06-12 LAB
ALBUMIN SERPL BCP-MCNC: 4 G/DL (ref 3.5–5)
ALBUMIN/GLOB SERPL: 1.1 {RATIO}
ALP SERPL-CCNC: 70 U/L (ref 55–142)
ALT SERPL W P-5'-P-CCNC: 23 U/L (ref 13–56)
ANION GAP SERPL CALCULATED.3IONS-SCNC: 10 MMOL/L (ref 7–16)
AST SERPL W P-5'-P-CCNC: 21 U/L (ref 15–37)
BASOPHILS # BLD AUTO: 0.06 K/UL (ref 0–0.2)
BASOPHILS NFR BLD AUTO: 1.3 % (ref 0–1)
BILIRUB SERPL-MCNC: 0.6 MG/DL (ref ?–1.2)
BUN SERPL-MCNC: 9 MG/DL (ref 7–18)
BUN/CREAT SERPL: 10 (ref 6–20)
CALCIUM SERPL-MCNC: 9.5 MG/DL (ref 8.5–10.1)
CHLORIDE SERPL-SCNC: 108 MMOL/L (ref 98–107)
CHOLEST SERPL-MCNC: 150 MG/DL (ref 0–200)
CHOLEST/HDLC SERPL: 2.6 {RATIO}
CO2 SERPL-SCNC: 27 MMOL/L (ref 21–32)
CREAT SERPL-MCNC: 0.93 MG/DL (ref 0.55–1.02)
DIFFERENTIAL METHOD BLD: ABNORMAL
EGFR (NO RACE VARIABLE) (RUSH/TITUS): 63 ML/MIN/1.73M2
EOSINOPHIL # BLD AUTO: 0.13 K/UL (ref 0–0.5)
EOSINOPHIL NFR BLD AUTO: 2.8 % (ref 1–4)
ERYTHROCYTE [DISTWIDTH] IN BLOOD BY AUTOMATED COUNT: 13.5 % (ref 11.5–14.5)
EST. AVERAGE GLUCOSE BLD GHB EST-MCNC: 126 MG/DL
GLOBULIN SER-MCNC: 3.6 G/DL (ref 2–4)
GLUCOSE SERPL-MCNC: 107 MG/DL (ref 74–106)
HBA1C MFR BLD HPLC: 6 % (ref 4.5–6.6)
HCT VFR BLD AUTO: 38.8 % (ref 38–47)
HDLC SERPL-MCNC: 57 MG/DL (ref 40–60)
HGB BLD-MCNC: 12.3 G/DL (ref 12–16)
IMM GRANULOCYTES # BLD AUTO: 0.01 K/UL (ref 0–0.04)
IMM GRANULOCYTES NFR BLD: 0.2 % (ref 0–0.4)
LDLC SERPL CALC-MCNC: 73 MG/DL
LDLC/HDLC SERPL: 1.3 {RATIO}
LYMPHOCYTES # BLD AUTO: 2.31 K/UL (ref 1–4.8)
LYMPHOCYTES NFR BLD AUTO: 49.8 % (ref 27–41)
MCH RBC QN AUTO: 27 PG (ref 27–31)
MCHC RBC AUTO-ENTMCNC: 31.7 G/DL (ref 32–36)
MCV RBC AUTO: 85.1 FL (ref 80–96)
MONOCYTES # BLD AUTO: 0.4 K/UL (ref 0–0.8)
MONOCYTES NFR BLD AUTO: 8.6 % (ref 2–6)
MPC BLD CALC-MCNC: 12.3 FL (ref 9.4–12.4)
NEUTROPHILS # BLD AUTO: 1.73 K/UL (ref 1.8–7.7)
NEUTROPHILS NFR BLD AUTO: 37.3 % (ref 53–65)
NONHDLC SERPL-MCNC: 93 MG/DL
NRBC # BLD AUTO: 0 X10E3/UL
NRBC, AUTO (.00): 0 %
PLATELET # BLD AUTO: 258 K/UL (ref 150–400)
POTASSIUM SERPL-SCNC: 3.8 MMOL/L (ref 3.5–5.1)
PROT SERPL-MCNC: 7.6 G/DL (ref 6.4–8.2)
RBC # BLD AUTO: 4.56 M/UL (ref 4.2–5.4)
SODIUM SERPL-SCNC: 141 MMOL/L (ref 136–145)
TRIGL SERPL-MCNC: 99 MG/DL (ref 35–150)
VLDLC SERPL-MCNC: 20 MG/DL
WBC # BLD AUTO: 4.64 K/UL (ref 4.5–11)

## 2024-06-12 PROCEDURE — 1101F PT FALLS ASSESS-DOCD LE1/YR: CPT | Mod: ,,, | Performed by: FAMILY MEDICINE

## 2024-06-12 PROCEDURE — 83036 HEMOGLOBIN GLYCOSYLATED A1C: CPT | Mod: ,,, | Performed by: CLINICAL MEDICAL LABORATORY

## 2024-06-12 PROCEDURE — 1160F RVW MEDS BY RX/DR IN RCRD: CPT | Mod: ,,, | Performed by: FAMILY MEDICINE

## 2024-06-12 PROCEDURE — 3074F SYST BP LT 130 MM HG: CPT | Mod: ,,, | Performed by: FAMILY MEDICINE

## 2024-06-12 PROCEDURE — 3078F DIAST BP <80 MM HG: CPT | Mod: ,,, | Performed by: FAMILY MEDICINE

## 2024-06-12 PROCEDURE — 99214 OFFICE O/P EST MOD 30 MIN: CPT | Mod: ,,, | Performed by: FAMILY MEDICINE

## 2024-06-12 PROCEDURE — 3288F FALL RISK ASSESSMENT DOCD: CPT | Mod: ,,, | Performed by: FAMILY MEDICINE

## 2024-06-12 PROCEDURE — 1159F MED LIST DOCD IN RCRD: CPT | Mod: ,,, | Performed by: FAMILY MEDICINE

## 2024-06-12 PROCEDURE — 1126F AMNT PAIN NOTED NONE PRSNT: CPT | Mod: ,,, | Performed by: FAMILY MEDICINE

## 2024-06-12 PROCEDURE — 80061 LIPID PANEL: CPT | Mod: ,,, | Performed by: CLINICAL MEDICAL LABORATORY

## 2024-06-12 PROCEDURE — 80053 COMPREHEN METABOLIC PANEL: CPT | Mod: ,,, | Performed by: CLINICAL MEDICAL LABORATORY

## 2024-06-12 PROCEDURE — 85025 COMPLETE CBC W/AUTO DIFF WBC: CPT | Mod: ,,, | Performed by: CLINICAL MEDICAL LABORATORY

## 2024-06-12 RX ORDER — AMLODIPINE BESYLATE 5 MG/1
5 TABLET ORAL DAILY
Qty: 90 TABLET | Refills: 1 | Status: SHIPPED | OUTPATIENT
Start: 2024-06-12

## 2024-06-12 RX ORDER — NITROFURANTOIN 25; 75 MG/1; MG/1
100 CAPSULE ORAL 2 TIMES DAILY
COMMUNITY
End: 2024-06-12 | Stop reason: ALTCHOICE

## 2024-06-12 NOTE — PROGRESS NOTES
"New Clinic Note    Luisana Barrett is a 78 y.o. female     CC:   Chief Complaint   Patient presents with    Annual Exam     Patient stated she is here for her routine 6 month check up, renewal of prescription sent to [facility] and is "fasting" for labs.     Hypertension    Diabetes    Medication Refill        Subjective    History of Present Illness HPI   Patient is for evaluation of chronic medical problems. Patient needs refills. Luisana  is tolerating medications well without side effects.       Current Outpatient Medications:     aspirin (ECOTRIN) 81 MG EC tablet, Take 81 mg by mouth once daily., Disp: , Rfl:     cholecalciferol, vitamin D3, 25 mcg (1,000 unit) Chew, Take 25 mcg by mouth once daily., Disp: , Rfl:     estradioL (ESTRACE) 0.01 % (0.1 mg/gram) vaginal cream, Place 1 g vaginally twice a week., Disp: 42.5 g, Rfl: 1    metFORMIN (GLUCOPHAGE) 500 MG tablet, TAKE 1 TABLET BY MOUTH TWICE  DAILY, Disp: 180 tablet, Rfl: 3    RESTASIS 0.05 % ophthalmic emulsion, Place 1 drop into both eyes 2 (two) times daily., Disp: , Rfl:     VIT C-VIT D3-E-ZINC-ELDERBERRY ORAL, Take 1 tablet by mouth once daily., Disp: , Rfl:     zinc gluconate 50 mg tablet, Take 50 mg by mouth once daily., Disp: , Rfl:     amLODIPine (NORVASC) 5 MG tablet, Take 1 tablet (5 mg total) by mouth once daily., Disp: 90 tablet, Rfl: 1     Past Medical History:   Diagnosis Date    Allergy     Breast cancer screening by mammogram 03/20/2024    [facility]    Diabetes mellitus, type 2     Diabetic eye exam 12/21/2023    Dr. Seamus Pereira - [facility]    Hypertension         Family History   Problem Relation Name Age of Onset    Stroke Mother      Cancer Father      Heart disease Sister      Stroke Sister          Past Surgical History:   Procedure Laterality Date    HYSTERECTOMY      TUBAL LIGATION          Review of Systems   Constitutional:  Negative for fatigue and fever.   HENT:  Negative for ear pain, postnasal drip, rhinorrhea and " "sinus pressure/congestion.    Respiratory:  Negative for cough and shortness of breath.    Cardiovascular:  Negative for chest pain.   Gastrointestinal:  Negative for abdominal pain, diarrhea, nausea and vomiting.   Genitourinary:  Negative for dysuria.   Neurological:  Negative for headaches.        /71 (BP Location: Left arm, Patient Position: Sitting, BP Method: Large (Automatic))   Pulse 60   Temp 97 °F (36.1 °C) (Oral)   Resp 18   Ht 5' 2" (1.575 m)   Wt 65.3 kg (144 lb)   SpO2 97%   BMI 26.34 kg/m²      Physical Exam  HENT:      Head: Normocephalic and atraumatic.   Cardiovascular:      Rate and Rhythm: Normal rate and regular rhythm.   Pulmonary:      Effort: Pulmonary effort is normal.      Breath sounds: Normal breath sounds.   Neurological:      Mental Status: She is alert and oriented to person, place, and time.   Psychiatric:         Mood and Affect: Mood normal.         Behavior: Behavior normal.          Assessment and Plan      ICD-10-CM ICD-9-CM   1. Essential hypertension, benign  I10 401.1   2. Type 2 diabetes mellitus without complication, without long-term current use of insulin  E11.9 250.00        1. Essential hypertension, benign  The current medical regimen is effective;  continue present plan and medications.  -     amLODIPine (NORVASC) 5 MG tablet; Take 1 tablet (5 mg total) by mouth once daily.  Dispense: 90 tablet; Refill: 1  -     CBC Auto Differential; Future; Expected date: 06/12/2024  -     Comprehensive Metabolic Panel; Future; Expected date: 06/12/2024  -     Lipid Panel; Future; Expected date: 06/12/2024    2. Type 2 diabetes mellitus without complication, without long-term current use of insulin  The current medical regimen is effective;  continue present plan and medications.  -     Comprehensive Metabolic Panel; Future; Expected date: 06/12/2024  -     Lipid Panel; Future; Expected date: 06/12/2024  -     Hemoglobin A1C; Future; Expected date: 06/12/2024      Follow " up in about 6 months (around 12/12/2024).

## 2024-08-09 DIAGNOSIS — Z71.89 COMPLEX CARE COORDINATION: ICD-10-CM

## 2024-08-29 LAB
LEFT EYE DM RETINOPATHY: NEGATIVE
RIGHT EYE DM RETINOPATHY: NEGATIVE

## 2024-10-01 DIAGNOSIS — I10 ESSENTIAL HYPERTENSION, BENIGN: ICD-10-CM

## 2024-10-02 RX ORDER — AMLODIPINE BESYLATE 5 MG/1
5 TABLET ORAL
Qty: 100 TABLET | Refills: 2 | Status: SHIPPED | OUTPATIENT
Start: 2024-10-02

## 2024-12-09 RX ORDER — APIXABAN 5 MG/1
5 TABLET, FILM COATED ORAL 2 TIMES DAILY
COMMUNITY
Start: 2024-08-30 | End: 2024-12-10 | Stop reason: ALTCHOICE

## 2024-12-10 ENCOUNTER — OFFICE VISIT (OUTPATIENT)
Dept: FAMILY MEDICINE | Facility: CLINIC | Age: 79
End: 2024-12-10
Payer: MEDICARE

## 2024-12-10 VITALS
DIASTOLIC BLOOD PRESSURE: 74 MMHG | BODY MASS INDEX: 26.5 KG/M2 | HEART RATE: 74 BPM | SYSTOLIC BLOOD PRESSURE: 118 MMHG | TEMPERATURE: 98 F | WEIGHT: 144 LBS | RESPIRATION RATE: 18 BRPM | HEIGHT: 62 IN | OXYGEN SATURATION: 97 %

## 2024-12-10 DIAGNOSIS — Z23 ENCOUNTER FOR IMMUNIZATION: ICD-10-CM

## 2024-12-10 DIAGNOSIS — E11.9 TYPE 2 DIABETES MELLITUS WITHOUT COMPLICATION, WITHOUT LONG-TERM CURRENT USE OF INSULIN: Primary | ICD-10-CM

## 2024-12-10 DIAGNOSIS — I10 PRIMARY HYPERTENSION: Chronic | ICD-10-CM

## 2024-12-10 LAB
EST. AVERAGE GLUCOSE BLD GHB EST-MCNC: 128 MG/DL
HBA1C MFR BLD HPLC: 6.1 %

## 2024-12-10 PROCEDURE — G0008 ADMIN INFLUENZA VIRUS VAC: HCPCS | Mod: ,,, | Performed by: FAMILY MEDICINE

## 2024-12-10 PROCEDURE — 1101F PT FALLS ASSESS-DOCD LE1/YR: CPT | Mod: ,,, | Performed by: FAMILY MEDICINE

## 2024-12-10 PROCEDURE — 3074F SYST BP LT 130 MM HG: CPT | Mod: ,,, | Performed by: FAMILY MEDICINE

## 2024-12-10 PROCEDURE — 83036 HEMOGLOBIN GLYCOSYLATED A1C: CPT | Mod: ,,, | Performed by: CLINICAL MEDICAL LABORATORY

## 2024-12-10 PROCEDURE — 99214 OFFICE O/P EST MOD 30 MIN: CPT | Mod: 25,,, | Performed by: FAMILY MEDICINE

## 2024-12-10 PROCEDURE — 1159F MED LIST DOCD IN RCRD: CPT | Mod: ,,, | Performed by: FAMILY MEDICINE

## 2024-12-10 PROCEDURE — 3078F DIAST BP <80 MM HG: CPT | Mod: ,,, | Performed by: FAMILY MEDICINE

## 2024-12-10 PROCEDURE — 1160F RVW MEDS BY RX/DR IN RCRD: CPT | Mod: ,,, | Performed by: FAMILY MEDICINE

## 2024-12-10 PROCEDURE — 3288F FALL RISK ASSESSMENT DOCD: CPT | Mod: ,,, | Performed by: FAMILY MEDICINE

## 2024-12-10 PROCEDURE — 90653 IIV ADJUVANT VACCINE IM: CPT | Mod: ,,, | Performed by: FAMILY MEDICINE

## 2024-12-10 PROCEDURE — 1126F AMNT PAIN NOTED NONE PRSNT: CPT | Mod: ,,, | Performed by: FAMILY MEDICINE

## 2024-12-10 NOTE — PROGRESS NOTES
"New Clinic Note    Luisana Barrett is a 79 y.o. female     CC:   Chief Complaint   Patient presents with    Diabetes     Patient is here for routine 6 month check up. She is "fasting "for labs. Does not need refills. Uses [facility] for maintenance medications. Wants her annual flu vaccine during this visit. Follow up from ER visit back 08/02/2024 where a folding table fell against the posterior RLE and she had possible superficial thrombus of leg and told to f/u with her PCP. She was on Eliquis 5 mg from 08/30/2024 until it was stopped on 09/15/2024.     Hypertension    Flu Vaccine    superficial leg thrombus        Subjective    History of Present Illness HPI   Patient is for evaluation of chronic medical problems. Patient does not  refills. Luisana  is tolerating medications well without side effects.   Patient had a superficial thrombus in August after a table hit her. This has resolved.     Current Outpatient Medications:     amLODIPine (NORVASC) 5 MG tablet, TAKE 1 TABLET BY MOUTH ONCE  DAILY, Disp: 100 tablet, Rfl: 2    aspirin (ECOTRIN) 81 MG EC tablet, Take 81 mg by mouth once daily., Disp: , Rfl:     cholecalciferol, vitamin D3, 25 mcg (1,000 unit) Chew, Take 25 mcg by mouth once daily., Disp: , Rfl:     estradioL (ESTRACE) 0.01 % (0.1 mg/gram) vaginal cream, Place 1 g vaginally twice a week., Disp: 42.5 g, Rfl: 1    metFORMIN (GLUCOPHAGE) 500 MG tablet, TAKE 1 TABLET BY MOUTH TWICE  DAILY, Disp: 180 tablet, Rfl: 3    RESTASIS 0.05 % ophthalmic emulsion, Place 1 drop into both eyes 2 (two) times daily., Disp: , Rfl:     VIT C-VIT D3-E-ZINC-ELDERBERRY ORAL, Take 1 tablet by mouth once daily., Disp: , Rfl:     zinc gluconate 50 mg tablet, Take 50 mg by mouth once daily., Disp: , Rfl:   No current facility-administered medications for this visit.     Past Medical History:   Diagnosis Date    Allergy     Breast cancer screening by mammogram 03/20/2024    [facility]    Diabetes mellitus, type 2     Diabetic eye " "exam 12/21/2023    Dr. Seamus Pereira [facility]    Hypertension         Family History   Problem Relation Name Age of Onset    Stroke Mother      Cancer Father      Heart disease Sister      Stroke Sister          Past Surgical History:   Procedure Laterality Date    HYSTERECTOMY      TUBAL LIGATION          Review of Systems   Constitutional:  Negative for fatigue and fever.   HENT:  Negative for ear pain, postnasal drip, rhinorrhea and sinus pressure/congestion.    Respiratory:  Negative for cough and shortness of breath.    Cardiovascular:  Negative for chest pain.   Gastrointestinal:  Negative for abdominal pain, diarrhea, nausea and vomiting.   Genitourinary:  Negative for dysuria.   Neurological:  Negative for headaches.        /74 (BP Location: Left arm, Patient Position: Sitting)   Pulse 74   Temp 98 °F (36.7 °C) (Oral)   Resp 18   Ht 5' 2" (1.575 m)   Wt 65.3 kg (144 lb)   SpO2 97%   BMI 26.34 kg/m²      Physical Exam  HENT:      Head: Normocephalic and atraumatic.   Cardiovascular:      Rate and Rhythm: Normal rate and regular rhythm.   Pulmonary:      Effort: Pulmonary effort is normal.      Breath sounds: Normal breath sounds.   Neurological:      Mental Status: She is alert and oriented to person, place, and time.   Psychiatric:         Mood and Affect: Mood normal.         Behavior: Behavior normal.          Assessment and Plan      ICD-10-CM ICD-9-CM   1. Type 2 diabetes mellitus without complication, without long-term current use of insulin  E11.9 250.00   2. Encounter for immunization  Z23 V03.89   3. Primary hypertension  I10 401.9        1. Type 2 diabetes mellitus without complication, without long-term current use of insulin  The current medical regimen is effective;  continue present plan and medications.  -     Hemoglobin A1C; Future; Expected date: 12/10/2024    2. Encounter for immunization  -     influenza (adjuvanted) (Fluad) 45 mcg/0.5 mL IM vaccine (> or = 65 " yo) 0.5 mL    3. Primary hypertension  The current medical regimen is effective;  continue present plan and medications.         Follow up in about 6 months (around 6/10/2025).

## 2024-12-11 ENCOUNTER — PATIENT OUTREACH (OUTPATIENT)
Facility: HOSPITAL | Age: 79
End: 2024-12-11
Payer: MEDICARE

## 2024-12-11 NOTE — LETTER
AUTHORIZATION FOR RELEASE OF   CONFIDENTIAL INFORMATION    Dear [facility],    We are seeing Luisana Barrett, date of birth 1945, in the clinic at [facility]. Mikayla Rosas MD is the patient's PCP. Luisana Barrett has an outstanding lab/procedure at the time we reviewed her chart. In order to help keep her health information updated, she has authorized us to request the following medical record(s):        (  )  MAMMOGRAM                                      (  )  COLONOSCOPY      (  )  PAP SMEAR                                          (  )  OUTSIDE LAB RESULTS     (  )  DEXA SCAN                                          ( x )  EYE EXAM            (  )  FOOT EXAM                                          (  )  ENTIRE RECORD     (  )  OUTSIDE IMMUNIZATIONS                 (  )  _______________         Please fax records to Mahamed Jimenez LPN Care Coordinator at [phone].      If you have any questions, please contact Mahamed at [phone].           Patient Name: Luisana Barrett  : 1945  Patient Phone #: [phone]

## 2024-12-11 NOTE — PROGRESS NOTES
Population Health Chart Review & Patient Outreach Details    Health Maintenance Topics Addressed and Outreach Outcomes / Actions Taken:  Diabetic Eye Exam [x] MONCHO sent to [facility].

## 2024-12-17 ENCOUNTER — PATIENT OUTREACH (OUTPATIENT)
Facility: HOSPITAL | Age: 79
End: 2024-12-17
Payer: MEDICARE

## 2024-12-17 NOTE — PROGRESS NOTES
Population Health Chart Review & Patient Outreach Details    Health Maintenance Topics Addressed and Outreach Outcomes / Actions Taken:  Diabetic Eye Exam [x] HM Updated with August 2024 Eye Exam (Dr. Pereira). History Updated.

## 2024-12-31 DIAGNOSIS — E11.9 TYPE 2 DIABETES MELLITUS WITHOUT COMPLICATION, WITH LONG-TERM CURRENT USE OF INSULIN: ICD-10-CM

## 2024-12-31 DIAGNOSIS — Z79.4 TYPE 2 DIABETES MELLITUS WITHOUT COMPLICATION, WITH LONG-TERM CURRENT USE OF INSULIN: ICD-10-CM

## 2025-01-03 RX ORDER — METFORMIN HYDROCHLORIDE 500 MG/1
500 TABLET ORAL 2 TIMES DAILY
Qty: 200 TABLET | Refills: 2 | Status: SHIPPED | OUTPATIENT
Start: 2025-01-03

## 2025-03-07 ENCOUNTER — OFFICE VISIT (OUTPATIENT)
Dept: FAMILY MEDICINE | Facility: CLINIC | Age: 80
End: 2025-03-07
Payer: MEDICARE

## 2025-03-07 VITALS
WEIGHT: 151.38 LBS | OXYGEN SATURATION: 96 % | TEMPERATURE: 99 F | HEART RATE: 63 BPM | SYSTOLIC BLOOD PRESSURE: 128 MMHG | RESPIRATION RATE: 14 BRPM | BODY MASS INDEX: 27.86 KG/M2 | DIASTOLIC BLOOD PRESSURE: 76 MMHG | HEIGHT: 62 IN

## 2025-03-07 DIAGNOSIS — E11.9 TYPE 2 DIABETES MELLITUS WITHOUT COMPLICATION, WITHOUT LONG-TERM CURRENT USE OF INSULIN: ICD-10-CM

## 2025-03-07 DIAGNOSIS — I10 PRIMARY HYPERTENSION: ICD-10-CM

## 2025-03-07 DIAGNOSIS — Z00.00 ENCOUNTER FOR SUBSEQUENT ANNUAL WELLNESS VISIT (AWV) IN MEDICARE PATIENT: Primary | ICD-10-CM

## 2025-03-07 LAB
ALBUMIN SERPL BCP-MCNC: 4 G/DL (ref 3.4–4.8)
ALBUMIN/GLOB SERPL: 1.2 {RATIO}
ALP SERPL-CCNC: 73 U/L (ref 40–150)
ALT SERPL W P-5'-P-CCNC: 13 U/L
ANION GAP SERPL CALCULATED.3IONS-SCNC: 12 MMOL/L (ref 7–16)
AST SERPL W P-5'-P-CCNC: 21 U/L (ref 5–34)
BILIRUB SERPL-MCNC: 0.7 MG/DL
BUN SERPL-MCNC: 9 MG/DL (ref 10–20)
BUN/CREAT SERPL: 10 (ref 6–20)
CALCIUM SERPL-MCNC: 9.9 MG/DL (ref 8.4–10.2)
CHLORIDE SERPL-SCNC: 105 MMOL/L (ref 98–107)
CO2 SERPL-SCNC: 28 MMOL/L (ref 23–31)
CREAT SERPL-MCNC: 0.92 MG/DL (ref 0.55–1.02)
CREAT UR-MCNC: 97 MG/DL (ref 15–325)
EGFR (NO RACE VARIABLE) (RUSH/TITUS): 63 ML/MIN/1.73M2
GLOBULIN SER-MCNC: 3.3 G/DL (ref 2–4)
GLUCOSE SERPL-MCNC: 96 MG/DL (ref 82–115)
MICROALBUMIN UR-MCNC: <0.5 MG/DL
MICROALBUMIN/CREAT RATIO PNL UR: NORMAL
POTASSIUM SERPL-SCNC: 3.8 MMOL/L (ref 3.5–5.1)
PROT SERPL-MCNC: 7.3 G/DL (ref 5.8–7.6)
SODIUM SERPL-SCNC: 141 MMOL/L (ref 136–145)

## 2025-03-07 PROCEDURE — 82043 UR ALBUMIN QUANTITATIVE: CPT | Mod: ,,, | Performed by: CLINICAL MEDICAL LABORATORY

## 2025-03-07 PROCEDURE — 80053 COMPREHEN METABOLIC PANEL: CPT | Mod: ,,, | Performed by: CLINICAL MEDICAL LABORATORY

## 2025-03-07 PROCEDURE — 82570 ASSAY OF URINE CREATININE: CPT | Mod: ,,, | Performed by: CLINICAL MEDICAL LABORATORY

## 2025-03-07 NOTE — PATIENT INSTRUCTIONS
Counseling and Referral of Other Preventative  (Italic type indicates deductible and co-insurance are waived)    Patient Name: Luisana Barrett  Today's Date: 3/7/2025    Health Maintenance       Date Due Completion Date    Shingles Vaccine (1 of 2) Never done ---    RSV Vaccine (Age 60+ and Pregnant patients) (1 - 1-dose 75+ series) Never done ---    COVID-19 Vaccine (3 - 2024-25 season) 09/01/2024 2/12/2021    Diabetes Urine Screening 03/01/2025 3/1/2024    Hemoglobin A1c 06/10/2025 12/10/2024    Lipid Panel 06/12/2025 6/12/2024    Diabetic Eye Exam 08/29/2025 8/29/2024    DEXA Scan 12/15/2025 12/15/2022    TETANUS VACCINE 12/21/2027 12/21/2017        Orders Placed This Encounter   Procedures    Microalbumin/Creatinine Ratio, Urine    Comprehensive Metabolic Panel     The following information is provided to all patients.  This information is to help you find resources for any of the problems found today that may be affecting your health:                  Living healthy guide: ms.gov    Understanding Diabetes: www.diabetes.org      Eating healthy: www.cdc.gov/healthyweight      CDC home safety checklist: www.cdc.gov/steadi/patient.html      Agency on Aging: ms.gov    Alcoholics anonymous (AA): www.aa.org      Physical Activity: www.rosalinda.nih.gov/uk3vhro      Tobacco use: ms.gov

## 2025-03-07 NOTE — PROGRESS NOTES
"  Luisana Barrett presented for a follow-up Medicare AWV today. The following components were reviewed and updated:    Medical history  Family History  Social history  Allergies and Current Medications  Health Risk Assessment  Health Maintenance  Care Team    **See Completed Assessments for Annual Wellness visit with in the encounter summary  Ordered CMP and Micro on today visit.    The following assessments were completed:  Depression Screening  Cognitive function Screening  Timed Get Up Test  Whisper Test      Opioid documentation:      Patient does not have a current opioid prescription.        Vitals:    03/07/25 1043   BP: 128/76   Pulse: 63   Resp: 14   Temp: 98.5 °F (36.9 °C)   SpO2: 96%   Weight: 68.7 kg (151 lb 6.4 oz)   Height: 5' 2" (1.575 m)     Body mass index is 27.69 kg/m².       Physical Exam  Constitutional:       Appearance: Normal appearance.   Cardiovascular:      Rate and Rhythm: Normal rate and regular rhythm.   Pulmonary:      Effort: Pulmonary effort is normal.      Breath sounds: Normal breath sounds.   Neurological:      General: No focal deficit present.      Mental Status: She is alert and oriented to person, place, and time.   Psychiatric:         Mood and Affect: Mood normal.         Behavior: Behavior normal.           Diagnoses and health risks identified today and associated recommendations/orders:  1. Encounter for subsequent annual wellness visit (AWV) in Medicare patient    2. Type 2 diabetes mellitus without complication, without long-term current use of insulin  The current medical regimen is effective;  continue present plan and medications.  - Microalbumin/Creatinine Ratio, Urine    3. Primary hypertension  The current medical regimen is effective;  continue present plan and medications.  - Comprehensive Metabolic Panel; Future  - Comprehensive Metabolic Panel    4. BMI 27.0-27.9,adult      Provided Luisana with a 5-10 year written screening schedule and personal prevention plan. " Recommendations were developed using the USPSTF age appropriate recommendations. Education, counseling, and referrals were provided as needed.  After Visit Summary printed and given to patient which includes a list of additional screenings\tests needed.    Follow up for 1 year for Annual Wellness Visit.      Mikayla Rosas MD      I offered to discuss advanced care planning, including how to pick a person who would make decisions for you if you were unable to make them for yourself, called a health care power of , and what kind of decisions you might make such as use of life sustaining treatments such as ventilators and tube feeding when faced with a life limiting illness recorded on a living will that they will need to know. (How you want to be cared for as you near the end of your natural life)     X Patient is interested in learning more about how to make advanced directives.  I provided them paperwork and offered to discuss this with them.

## 2025-03-10 ENCOUNTER — RESULTS FOLLOW-UP (OUTPATIENT)
Dept: FAMILY MEDICINE | Facility: CLINIC | Age: 80
End: 2025-03-10

## 2025-03-25 ENCOUNTER — TELEPHONE (OUTPATIENT)
Dept: FAMILY MEDICINE | Facility: CLINIC | Age: 80
End: 2025-03-25
Payer: MEDICARE

## 2025-03-25 NOTE — TELEPHONE ENCOUNTER
Patient called stating she has an appt for her mammogram at [facility] on 04/03 at 3:30 pm.

## 2025-04-01 DIAGNOSIS — Z12.31 ENCOUNTER FOR SCREENING MAMMOGRAM FOR BREAST CANCER: Primary | ICD-10-CM

## 2025-04-03 LAB — BCS RECOMMENDATION EXT: NORMAL

## 2025-04-14 ENCOUNTER — PATIENT OUTREACH (OUTPATIENT)
Facility: HOSPITAL | Age: 80
End: 2025-04-14
Payer: MEDICARE

## 2025-04-14 NOTE — PROGRESS NOTES
Population Health Chart Review & Patient Outreach Details    Health Maintenance Topics Addressed and Outreach Outcomes / Actions Taken:  Breast Cancer Screening [x] HM Updated with April 2025 Mammogram ([facility]). History Updated.

## 2025-04-16 ENCOUNTER — RESULTS FOLLOW-UP (OUTPATIENT)
Dept: FAMILY MEDICINE | Facility: CLINIC | Age: 80
End: 2025-04-16

## 2025-06-25 ENCOUNTER — OFFICE VISIT (OUTPATIENT)
Dept: FAMILY MEDICINE | Facility: CLINIC | Age: 80
End: 2025-06-25
Payer: MEDICARE

## 2025-06-25 ENCOUNTER — HOSPITAL ENCOUNTER (OUTPATIENT)
Dept: RADIOLOGY | Facility: HOSPITAL | Age: 80
Discharge: HOME | End: 2025-06-25
Attending: FAMILY MEDICINE
Payer: MEDICARE

## 2025-06-25 VITALS
WEIGHT: 144 LBS | HEIGHT: 62 IN | OXYGEN SATURATION: 97 % | SYSTOLIC BLOOD PRESSURE: 104 MMHG | BODY MASS INDEX: 26.5 KG/M2 | TEMPERATURE: 97 F | HEART RATE: 75 BPM | DIASTOLIC BLOOD PRESSURE: 67 MMHG | RESPIRATION RATE: 18 BRPM

## 2025-06-25 DIAGNOSIS — S69.91XA INJURY OF RIGHT THUMB, INITIAL ENCOUNTER: ICD-10-CM

## 2025-06-25 DIAGNOSIS — E11.9 TYPE 2 DIABETES MELLITUS WITHOUT COMPLICATION, WITH LONG-TERM CURRENT USE OF INSULIN: ICD-10-CM

## 2025-06-25 DIAGNOSIS — Z79.4 TYPE 2 DIABETES MELLITUS WITHOUT COMPLICATION, WITH LONG-TERM CURRENT USE OF INSULIN: ICD-10-CM

## 2025-06-25 DIAGNOSIS — I10 ESSENTIAL HYPERTENSION, BENIGN: ICD-10-CM

## 2025-06-25 DIAGNOSIS — S69.91XA INJURY OF RIGHT THUMB, INITIAL ENCOUNTER: Primary | ICD-10-CM

## 2025-06-25 LAB
ALBUMIN SERPL BCP-MCNC: 4.4 G/DL (ref 3.4–4.8)
ALBUMIN/GLOB SERPL: 1.2 {RATIO}
ALP SERPL-CCNC: 71 U/L (ref 40–150)
ALT SERPL W P-5'-P-CCNC: 18 U/L
ANION GAP SERPL CALCULATED.3IONS-SCNC: 14 MMOL/L (ref 7–16)
AST SERPL W P-5'-P-CCNC: 32 U/L (ref 11–45)
BASOPHILS # BLD AUTO: 0.06 K/UL (ref 0–0.2)
BASOPHILS NFR BLD AUTO: 1.1 % (ref 0–1)
BILIRUB SERPL-MCNC: 0.9 MG/DL
BUN SERPL-MCNC: 12 MG/DL (ref 10–20)
BUN/CREAT SERPL: 10 (ref 6–20)
CALCIUM SERPL-MCNC: 9.8 MG/DL (ref 8.4–10.2)
CHLORIDE SERPL-SCNC: 104 MMOL/L (ref 98–107)
CHOLEST SERPL-MCNC: 179 MG/DL
CHOLEST/HDLC SERPL: 3.9 {RATIO}
CO2 SERPL-SCNC: 24 MMOL/L (ref 23–31)
CREAT SERPL-MCNC: 1.19 MG/DL (ref 0.55–1.02)
DIFFERENTIAL METHOD BLD: ABNORMAL
EGFR (NO RACE VARIABLE) (RUSH/TITUS): 47 ML/MIN/1.73M2
EOSINOPHIL # BLD AUTO: 0.12 K/UL (ref 0–0.5)
EOSINOPHIL NFR BLD AUTO: 2.3 % (ref 1–4)
ERYTHROCYTE [DISTWIDTH] IN BLOOD BY AUTOMATED COUNT: 14 % (ref 11.5–14.5)
EST. AVERAGE GLUCOSE BLD GHB EST-MCNC: 134 MG/DL
GLOBULIN SER-MCNC: 3.8 G/DL (ref 2–4)
GLUCOSE SERPL-MCNC: 97 MG/DL (ref 82–115)
HBA1C MFR BLD HPLC: 6.3 %
HCT VFR BLD AUTO: 39.6 % (ref 38–47)
HDLC SERPL-MCNC: 46 MG/DL (ref 35–60)
HGB BLD-MCNC: 12.6 G/DL (ref 12–16)
IMM GRANULOCYTES # BLD AUTO: 0 K/UL (ref 0–0.04)
IMM GRANULOCYTES NFR BLD: 0 % (ref 0–0.4)
LDLC SERPL CALC-MCNC: 116 MG/DL
LDLC/HDLC SERPL: 2.5 {RATIO}
LYMPHOCYTES # BLD AUTO: 2.84 K/UL (ref 1–4.8)
LYMPHOCYTES NFR BLD AUTO: 53.7 % (ref 27–41)
MCH RBC QN AUTO: 26.1 PG (ref 27–31)
MCHC RBC AUTO-ENTMCNC: 31.8 G/DL (ref 32–36)
MCV RBC AUTO: 82 FL (ref 80–96)
MONOCYTES # BLD AUTO: 0.46 K/UL (ref 0–0.8)
MONOCYTES NFR BLD AUTO: 8.7 % (ref 2–6)
MPC BLD CALC-MCNC: 12.1 FL (ref 9.4–12.4)
NEUTROPHILS # BLD AUTO: 1.81 K/UL (ref 1.8–7.7)
NEUTROPHILS NFR BLD AUTO: 34.2 % (ref 53–65)
NONHDLC SERPL-MCNC: 133 MG/DL
NRBC # BLD AUTO: 0 X10E3/UL
NRBC, AUTO (.00): 0 %
PLATELET # BLD AUTO: 278 K/UL (ref 150–400)
POTASSIUM SERPL-SCNC: 4.5 MMOL/L (ref 3.5–5.1)
PROT SERPL-MCNC: 8.2 G/DL (ref 5.8–7.6)
RBC # BLD AUTO: 4.83 M/UL (ref 4.2–5.4)
SODIUM SERPL-SCNC: 137 MMOL/L (ref 136–145)
TRIGL SERPL-MCNC: 85 MG/DL (ref 37–140)
VLDLC SERPL-MCNC: 17 MG/DL
WBC # BLD AUTO: 5.29 K/UL (ref 4.5–11)

## 2025-06-25 PROCEDURE — 83036 HEMOGLOBIN GLYCOSYLATED A1C: CPT | Mod: ,,, | Performed by: CLINICAL MEDICAL LABORATORY

## 2025-06-25 PROCEDURE — 1160F RVW MEDS BY RX/DR IN RCRD: CPT | Mod: ,,, | Performed by: FAMILY MEDICINE

## 2025-06-25 PROCEDURE — 99214 OFFICE O/P EST MOD 30 MIN: CPT | Mod: ,,, | Performed by: FAMILY MEDICINE

## 2025-06-25 PROCEDURE — 80053 COMPREHEN METABOLIC PANEL: CPT | Mod: ,,, | Performed by: CLINICAL MEDICAL LABORATORY

## 2025-06-25 PROCEDURE — 80061 LIPID PANEL: CPT | Mod: ,,, | Performed by: CLINICAL MEDICAL LABORATORY

## 2025-06-25 PROCEDURE — 3074F SYST BP LT 130 MM HG: CPT | Mod: ,,, | Performed by: FAMILY MEDICINE

## 2025-06-25 PROCEDURE — 1125F AMNT PAIN NOTED PAIN PRSNT: CPT | Mod: ,,, | Performed by: FAMILY MEDICINE

## 2025-06-25 PROCEDURE — 3078F DIAST BP <80 MM HG: CPT | Mod: ,,, | Performed by: FAMILY MEDICINE

## 2025-06-25 PROCEDURE — 1159F MED LIST DOCD IN RCRD: CPT | Mod: ,,, | Performed by: FAMILY MEDICINE

## 2025-06-25 PROCEDURE — 73130 X-RAY EXAM OF HAND: CPT | Mod: TC,PN,RT

## 2025-06-25 PROCEDURE — 85025 COMPLETE CBC W/AUTO DIFF WBC: CPT | Mod: ,,, | Performed by: CLINICAL MEDICAL LABORATORY

## 2025-06-25 PROCEDURE — 1101F PT FALLS ASSESS-DOCD LE1/YR: CPT | Mod: ,,, | Performed by: FAMILY MEDICINE

## 2025-06-25 PROCEDURE — 3288F FALL RISK ASSESSMENT DOCD: CPT | Mod: ,,, | Performed by: FAMILY MEDICINE

## 2025-06-25 RX ORDER — METFORMIN HYDROCHLORIDE 500 MG/1
500 TABLET ORAL 2 TIMES DAILY
Qty: 200 TABLET | Refills: 2 | Status: SHIPPED | OUTPATIENT
Start: 2025-06-25

## 2025-06-25 RX ORDER — AMLODIPINE BESYLATE 5 MG/1
5 TABLET ORAL DAILY
Qty: 100 TABLET | Refills: 2 | Status: SHIPPED | OUTPATIENT
Start: 2025-06-25

## 2025-06-25 NOTE — PROGRESS NOTES
"New Clinic Note    Luisana Barrett is a 79 y.o. female     CC:   Chief Complaint   Patient presents with    Hypertension     Patient stated she is here for routine 6 month check up. Needs refills sent to [facility]. She is completely out of her Amlodipine and will need a 30 day supply sent to [facility] until the Mail Delivery is sent. Complains of right thumb pain for one month and wants referral to a Chiropractor here in [place] because she thinks her thumb is out of place after small chain saw bounced and hit lower part of right thumb. Stated she cannot squeeze out a wet dish cloth or .     Diabetes    Medication Refill    Health Maintenance     Shingles Vaccine(1 of 2) declined  RSV Vaccine (Age 60+ and Pregnant patients)(1 - 1-dose 75+ series) declined  COVID-19 Vaccine(3 - 2024-25 season) declined  Hemoglobin A1c due on 06/10/2025  Lipid Panel due on 06/12/2025  Diabetic Eye Exam due on 08/29/2025         Subjective    History of Present Illness HPI   Patient is for evaluation of chronic medical problems. Patient needs refills. Luisana  is tolerating medications well without side effects.   Patient complains of right thumb pain for one week. She says that she was using a saw and it "popped back" and hit her in the thumb. It did not cut her. She says the pain is worse when she works in her garden. She has taken aspirin without relief.   Current Medications[1]     Past Medical History:   Diagnosis Date    Allergy     Breast cancer screening by mammogram 03/20/2024    [facility]    Breast cancer screening by mammogram 04/03/2025    [facility]    Diabetes mellitus, type 2     Diabetic eye exam 12/21/2023    Dr. Seamus Pereira [facility]    Diabetic eye exam 08/29/2024    Dr. Seamus Pereira [facility]    Hypertension         Family History   Problem Relation Name Age of Onset    Stroke Mother      Cancer Father      Heart disease Sister      Stroke " "Sister          Past Surgical History:   Procedure Laterality Date    HYSTERECTOMY      TUBAL LIGATION          Review of Systems   Constitutional:  Negative for fatigue and fever.   HENT:  Negative for ear pain, postnasal drip, rhinorrhea and sinus pressure/congestion.    Respiratory:  Negative for cough and shortness of breath.    Cardiovascular:  Negative for chest pain.   Gastrointestinal:  Negative for abdominal pain, diarrhea, nausea and vomiting.   Genitourinary:  Negative for dysuria.   Neurological:  Negative for headaches.        /67 (BP Location: Left arm, Patient Position: Sitting)   Pulse 75   Temp 97.2 °F (36.2 °C) (Oral)   Resp 18   Ht 5' 2" (1.575 m)   Wt 65.3 kg (144 lb)   SpO2 97%   BMI 26.34 kg/m²      Physical Exam  HENT:      Head: Normocephalic and atraumatic.   Cardiovascular:      Rate and Rhythm: Normal rate and regular rhythm.   Pulmonary:      Effort: Pulmonary effort is normal.      Breath sounds: Normal breath sounds.   Musculoskeletal:      Right hand: Tenderness present. No swelling. Decreased range of motion.   Neurological:      Mental Status: She is alert and oriented to person, place, and time.   Psychiatric:         Mood and Affect: Mood normal.         Behavior: Behavior normal.          Assessment and Plan      ICD-10-CM ICD-9-CM   1. Injury of right thumb, initial encounter  S69.91XA 959.5   2. Essential hypertension, benign  I10 401.1   3. Type 2 diabetes mellitus without complication, with long-term current use of insulin  E11.9 250.00    Z79.4 V58.67        1. Injury of right thumb, initial encounter  Refer to ortho.  -     X-Ray Hand 3 View Right; Future; Expected date: 06/25/2025  -     Ambulatory referral/consult to Orthopedics; Future; Expected date: 07/02/2025    2. Essential hypertension, benign  The current medical regimen is effective;  continue present plan and medications.  -     amLODIPine (NORVASC) 5 MG tablet; Take 1 tablet (5 mg total) by mouth " once daily.  Dispense: 100 tablet; Refill: 2  -     Comprehensive Metabolic Panel; Future; Expected date: 06/25/2025  -     CBC Auto Differential; Future; Expected date: 06/25/2025  -     Lipid Panel; Future; Expected date: 06/25/2025    3. Type 2 diabetes mellitus without complication, with long-term current use of insulin  The current medical regimen is effective;  continue present plan and medications.  -     metFORMIN (GLUCOPHAGE) 500 MG tablet; Take 1 tablet (500 mg total) by mouth 2 (two) times daily.  Dispense: 200 tablet; Refill: 2  -     Hemoglobin A1C; Future; Expected date: 06/25/2025       Follow up in about 3 months (around 9/25/2025).            [1]   Current Outpatient Medications:     aspirin (ECOTRIN) 81 MG EC tablet, Take 81 mg by mouth once daily., Disp: , Rfl:     cholecalciferol, vitamin D3, 25 mcg (1,000 unit) Chew, Take 25 mcg by mouth once daily., Disp: , Rfl:     estradioL (ESTRACE) 0.01 % (0.1 mg/gram) vaginal cream, Place 1 g vaginally twice a week., Disp: 42.5 g, Rfl: 1    RESTASIS 0.05 % ophthalmic emulsion, Place 1 drop into both eyes 2 (two) times daily., Disp: , Rfl:     VIT C-VIT D3-E-ZINC-ELDERBERRY ORAL, Take 1 tablet by mouth once daily., Disp: , Rfl:     zinc gluconate 50 mg tablet, Take 50 mg by mouth once daily., Disp: , Rfl:     amLODIPine (NORVASC) 5 MG tablet, Take 1 tablet (5 mg total) by mouth once daily., Disp: 100 tablet, Rfl: 2    metFORMIN (GLUCOPHAGE) 500 MG tablet, Take 1 tablet (500 mg total) by mouth 2 (two) times daily., Disp: 200 tablet, Rfl: 2

## 2025-07-02 ENCOUNTER — OFFICE VISIT (OUTPATIENT)
Dept: ORTHOPEDICS | Facility: CLINIC | Age: 80
End: 2025-07-02
Payer: MEDICARE

## 2025-07-02 VITALS — BODY MASS INDEX: 26.5 KG/M2 | HEIGHT: 62 IN | WEIGHT: 144 LBS

## 2025-07-02 DIAGNOSIS — S69.91XA INJURY OF RIGHT THUMB, INITIAL ENCOUNTER: ICD-10-CM

## 2025-07-02 DIAGNOSIS — M18.9 OSTEOARTHRITIS OF FIRST CARPOMETACARPAL (CMC) JOINT OF ONE HAND: Primary | ICD-10-CM

## 2025-07-02 PROCEDURE — 1159F MED LIST DOCD IN RCRD: CPT | Mod: CPTII,,, | Performed by: NURSE PRACTITIONER

## 2025-07-02 PROCEDURE — 99202 OFFICE O/P NEW SF 15 MIN: CPT | Mod: ,,, | Performed by: NURSE PRACTITIONER

## 2025-07-02 PROCEDURE — 1160F RVW MEDS BY RX/DR IN RCRD: CPT | Mod: CPTII,,, | Performed by: NURSE PRACTITIONER

## 2025-07-02 NOTE — PROGRESS NOTES
CC:  Right thumb pain     HPI:  79-year-old female presents with complaint in her dominant right thumb.  Symptoms began approximately solid home to cut down some did show rubbery.  She states the chainsaw did keep back with the hand or striking the thenar area of her thumb.  She was not cut.  She did see her primary care provider where x-rays were obtained on her right hand on 06/02/2025 which showed arthritic changes.  The patient felt that her thumb was dislocated.  She did want to see a chiropractor to have it reduced.  Her primary care provider did inform her that there was no dislocation.  She does state that she has constant pain while awake that is a 6/10.  States when she uses it to grasp or squeeze that the pain increases to a 10.  She is not able to identify any specific activities that improves the discomfort.  She did try using aspirin without relief of symptoms.  She has not had the benefit of a brace.    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  There was also scattered arthritic changes throughout the hand.  There was no obvious fracture dislocation or pathologic bone noted.      PE: Physical exam in general she is awake, alert and oriented appropriately.  She is very pleasant and answers all questions appropriately.  Physical exam of the right wrist she has normal motion right wrist at least stage II pain.  There is a scar on the medial side of the wrist.  She states it is a surgical scar where she had a cyst removed in the past.  She is able to clench a tight fist.  This does cause him discomfort.  She is able to fully oppose the base of her little finger with her right thumb.  She is able to extend her thumb is in his hiking.  She has good strength of the thumb.  Apley grind test reproduces pain symptoms.  Right radial pulse is 2/4.  Capillary refill digits right hand less than 3 seconds.      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  We discussed treatment options to include but not limited to use a thumb spica splint for the next 4 days to rest her thumb.  May remove at night since she does not have nighttime pain.  After 4 days encouraged to use in time she will be use the pain she motion with her thumb.  Discussed the use of topical NSAID medication as well as over-the-counter acetaminophen.  She is to use these per label directions.  Discuss the possibility of intra-articular steroid injections in the future.  She says she is not really interested in this.  Discussed that if she failed the conservative treatments that there was surgical intervention options for her.  If she fails to improve we will consider referral to orthopedic surgeon.  Return as needed.

## 2025-07-02 NOTE — PATIENT INSTRUCTIONS
Apply 2 grams of Voltaren gel (diclofenac) to right thumb twice daily.    May take 2 extra strength tylenol (acetaminophen) every 8 hours for pain.    Wear thumb brace for rest 4-5 days, then as needed for strenuous activities.

## 2025-07-03 ENCOUNTER — RESULTS FOLLOW-UP (OUTPATIENT)
Dept: FAMILY MEDICINE | Facility: CLINIC | Age: 80
End: 2025-07-03

## 2025-07-03 DIAGNOSIS — N28.9 FUNCTION KIDNEY DECREASED: Primary | ICD-10-CM
